# Patient Record
Sex: MALE | Race: WHITE | NOT HISPANIC OR LATINO
[De-identification: names, ages, dates, MRNs, and addresses within clinical notes are randomized per-mention and may not be internally consistent; named-entity substitution may affect disease eponyms.]

---

## 2017-11-29 ENCOUNTER — APPOINTMENT (OUTPATIENT)
Dept: MRI IMAGING | Facility: CLINIC | Age: 67
End: 2017-11-29
Payer: MEDICARE

## 2017-11-29 ENCOUNTER — OUTPATIENT (OUTPATIENT)
Dept: OUTPATIENT SERVICES | Facility: HOSPITAL | Age: 67
LOS: 1 days | End: 2017-11-29
Payer: MEDICARE

## 2017-11-29 DIAGNOSIS — Z00.8 ENCOUNTER FOR OTHER GENERAL EXAMINATION: ICD-10-CM

## 2017-11-29 DIAGNOSIS — Z98.89 OTHER SPECIFIED POSTPROCEDURAL STATES: Chronic | ICD-10-CM

## 2017-11-29 DIAGNOSIS — T85.84XA PAIN DUE TO INTERNAL PROSTHETIC DEVICES, IMPLANTS AND GRAFTS, NOT ELSEWHERE CLASSIFIED, INITIAL ENCOUNTER: Chronic | ICD-10-CM

## 2017-11-29 PROCEDURE — 73218 MRI UPPER EXTREMITY W/O DYE: CPT | Mod: 26,LT

## 2017-11-29 PROCEDURE — 73218 MRI UPPER EXTREMITY W/O DYE: CPT

## 2018-06-23 ENCOUNTER — EMERGENCY (EMERGENCY)
Facility: HOSPITAL | Age: 68
LOS: 1 days | Discharge: HOME | End: 2018-06-23
Attending: EMERGENCY MEDICINE
Payer: MEDICARE

## 2018-06-23 VITALS
TEMPERATURE: 98 F | SYSTOLIC BLOOD PRESSURE: 176 MMHG | OXYGEN SATURATION: 95 % | DIASTOLIC BLOOD PRESSURE: 89 MMHG | HEART RATE: 64 BPM | RESPIRATION RATE: 16 BRPM

## 2018-06-23 DIAGNOSIS — Z98.89 OTHER SPECIFIED POSTPROCEDURAL STATES: Chronic | ICD-10-CM

## 2018-06-23 DIAGNOSIS — T85.84XA PAIN DUE TO INTERNAL PROSTHETIC DEVICES, IMPLANTS AND GRAFTS, NOT ELSEWHERE CLASSIFIED, INITIAL ENCOUNTER: Chronic | ICD-10-CM

## 2018-06-23 PROCEDURE — 99283 EMERGENCY DEPT VISIT LOW MDM: CPT

## 2018-06-23 PROCEDURE — 99284 EMERGENCY DEPT VISIT MOD MDM: CPT

## 2018-06-23 PROCEDURE — 73564 X-RAY EXAM KNEE 4 OR MORE: CPT

## 2018-06-23 PROCEDURE — 73564 X-RAY EXAM KNEE 4 OR MORE: CPT | Mod: 26,LT

## 2018-06-23 RX ORDER — ACETAMINOPHEN 500 MG
650 TABLET ORAL ONCE
Qty: 0 | Refills: 0 | Status: COMPLETED | OUTPATIENT
Start: 2018-06-23 | End: 2018-06-23

## 2018-06-23 RX ADMIN — Medication 650 MILLIGRAM(S): at 11:36

## 2018-06-23 NOTE — ED PROVIDER NOTE - PROGRESS NOTE DETAILS
Seen by ortho req opt follow up  Alf Quesada MD, Facep Seen by ortho req opt follow up, Knee imobilizer/ace applied by ortho.  Alf Quesada MD, Facep

## 2018-06-23 NOTE — CONSULT NOTE ADULT - SUBJECTIVE AND OBJECTIVE BOX
Orthopaedic Surgery Consult Note    Patient is a 67y old  Male who presents with a chief complaint of L knee pain s/p runnning for ball. Patient hyperextended knee. Similar incident 3 weeks ago.      PAST MEDICAL & SURGICAL HISTORY:  GERD (gastroesophageal reflux disease)  High cholesterol  S/P left knee surgery  H/O shoulder surgery: bilateral  Dental implant pain    [] No significant past history as reviewed with the patient and family    FAMILY HISTORY:  No pertinent family history in first degree relatives    [] Family history not pertinent as reviewed with the patient and family    SOCIAL HISTORY:    MEDICATIONS  (STANDING):    MEDICATIONS  (PRN):    Allergies    No Known Allergies    Intolerances        REVIEW OF SYSTEMS      Vital Signs Last 24 Hrs  T(C): 36.9 (23 Jun 2018 10:48), Max: 36.9 (23 Jun 2018 10:48)  T(F): 98.4 (23 Jun 2018 10:48), Max: 98.4 (23 Jun 2018 10:48)  HR: 64 (23 Jun 2018 10:48) (64 - 64)  BP: 176/89 (23 Jun 2018 10:48) (176/89 - 176/89)  BP(mean): --  RR: 16 (23 Jun 2018 10:48) (16 - 16)  SpO2: 95% (23 Jun 2018 10:48) (95% - 95%)      PHYSICAL EXAM:  L Knee:   skin intact, No tenderness to palpation medial or lateral joint line, patella, distal femur, tib/fib  ROM: Full active and passive ROM without pain  negative Arianna's, Negative anterior and posterior drawer  Motor intact TA/GCS/EHL/FHL   SILT DP/SP/Tib  cap refill <2 sec, wwp    Imaging: Xrays L knee negative    A/P: 67M with left knee pain, negative imaging, negative exam  - WBAT (may give crutches if cannot walk out of ED)  - ACE and soft roll for comfort, KI for comfort while walking today  - encourage to attempt ambulation 6/23 evening without KI  - f/u in office with Dr. Ames within 1 week of discharge  - discussed with Dr. Ames                        IMAGING STUDIES:    67y Male

## 2018-06-23 NOTE — ED PROVIDER NOTE - OBJECTIVE STATEMENT
Private Physician Blayne Antoine PCP, Sahil Ames ([address])  67 y male pmh Rotator cuff, Left knee  meniscus surg, Gerd, HLD, Pt comes to ed complains of pain left knee sp funning for ball. Had knee hyperextended. Had similar pain three weeks ago "much less severe". Pain now 2/10. Max was 9/10 initially. Seen of field and treated with ace and ice pack.

## 2018-06-23 NOTE — ED ADULT NURSE NOTE - OBJECTIVE STATEMENT
67 year old retired ,  had a  soft ball accident this am, pt was running for fly ball and he stopped, his foot caught the ground, hyperextended left leg and then fell. Pt was BIB EMS, pt now c/o pain, some swelling noted

## 2018-06-23 NOTE — ED PROVIDER NOTE - CARE PLAN
Principal Discharge DX:	Knee strain, left, initial encounter  Assessment and plan of treatment:	1. Follow up with your orthopedic Dr Next week  2. Use knee immobilizer  3. Partial weight bearing/crutched  4. Ice elevation next 24 hours

## 2018-06-23 NOTE — ED PROVIDER NOTE - PLAN OF CARE
1. Follow up with your orthopedic Dr Next week  2. Use knee immobilizer  3. Partial weight bearing/crutched  4. Ice elevation next 24 hours

## 2018-06-26 ENCOUNTER — FORM ENCOUNTER (OUTPATIENT)
Age: 68
End: 2018-06-26

## 2018-06-26 ENCOUNTER — APPOINTMENT (OUTPATIENT)
Dept: ORTHOPEDIC SURGERY | Facility: CLINIC | Age: 68
End: 2018-06-26
Payer: MEDICARE

## 2018-06-26 VITALS
BODY MASS INDEX: 23.54 KG/M2 | SYSTOLIC BLOOD PRESSURE: 159 MMHG | HEIGHT: 67 IN | WEIGHT: 150 LBS | DIASTOLIC BLOOD PRESSURE: 81 MMHG | HEART RATE: 62 BPM

## 2018-06-26 DIAGNOSIS — M25.462 EFFUSION, LEFT KNEE: ICD-10-CM

## 2018-06-26 DIAGNOSIS — S83.92XA SPRAIN OF UNSPECIFIED SITE OF LEFT KNEE, INITIAL ENCOUNTER: ICD-10-CM

## 2018-06-26 PROCEDURE — 99204 OFFICE O/P NEW MOD 45 MIN: CPT

## 2018-06-26 PROCEDURE — 73564 X-RAY EXAM KNEE 4 OR MORE: CPT | Mod: LT

## 2018-06-26 RX ORDER — MELOXICAM 15 MG/1
15 TABLET ORAL DAILY
Qty: 30 | Refills: 0 | Status: ACTIVE | COMMUNITY
Start: 2018-06-26 | End: 1900-01-01

## 2018-06-27 ENCOUNTER — APPOINTMENT (OUTPATIENT)
Dept: MRI IMAGING | Facility: CLINIC | Age: 68
End: 2018-06-27
Payer: MEDICARE

## 2018-06-27 ENCOUNTER — OUTPATIENT (OUTPATIENT)
Dept: OUTPATIENT SERVICES | Facility: HOSPITAL | Age: 68
LOS: 1 days | End: 2018-06-27
Payer: MEDICARE

## 2018-06-27 DIAGNOSIS — Z98.89 OTHER SPECIFIED POSTPROCEDURAL STATES: Chronic | ICD-10-CM

## 2018-06-27 DIAGNOSIS — Z00.8 ENCOUNTER FOR OTHER GENERAL EXAMINATION: ICD-10-CM

## 2018-06-27 DIAGNOSIS — T85.84XA PAIN DUE TO INTERNAL PROSTHETIC DEVICES, IMPLANTS AND GRAFTS, NOT ELSEWHERE CLASSIFIED, INITIAL ENCOUNTER: Chronic | ICD-10-CM

## 2018-06-27 PROCEDURE — 73721 MRI JNT OF LWR EXTRE W/O DYE: CPT | Mod: 26,LT

## 2018-06-27 PROCEDURE — 73721 MRI JNT OF LWR EXTRE W/O DYE: CPT

## 2018-06-29 ENCOUNTER — APPOINTMENT (OUTPATIENT)
Dept: ORTHOPEDIC SURGERY | Facility: CLINIC | Age: 68
End: 2018-06-29
Payer: MEDICARE

## 2018-06-29 VITALS
WEIGHT: 150 LBS | DIASTOLIC BLOOD PRESSURE: 77 MMHG | HEIGHT: 67 IN | BODY MASS INDEX: 23.54 KG/M2 | SYSTOLIC BLOOD PRESSURE: 155 MMHG | HEART RATE: 69 BPM

## 2018-06-29 DIAGNOSIS — Z86.39 PERSONAL HISTORY OF OTHER ENDOCRINE, NUTRITIONAL AND METABOLIC DISEASE: ICD-10-CM

## 2018-06-29 DIAGNOSIS — Z87.39 PERSONAL HISTORY OF OTHER DISEASES OF THE MUSCULOSKELETAL SYSTEM AND CONNECTIVE TISSUE: ICD-10-CM

## 2018-06-29 DIAGNOSIS — Z87.891 PERSONAL HISTORY OF NICOTINE DEPENDENCE: ICD-10-CM

## 2018-06-29 PROCEDURE — 99214 OFFICE O/P EST MOD 30 MIN: CPT

## 2018-06-29 RX ORDER — FLUTICASONE PROPIONATE 50 UG/1
50 SPRAY, METERED NASAL
Qty: 16 | Refills: 0 | Status: ACTIVE | COMMUNITY
Start: 2018-03-31

## 2018-06-29 RX ORDER — MELOXICAM 15 MG/1
15 TABLET ORAL
Qty: 60 | Refills: 0 | Status: ACTIVE | COMMUNITY
Start: 2018-01-16

## 2018-06-29 RX ORDER — CIPROFLOXACIN HYDROCHLORIDE 500 MG/1
500 TABLET, FILM COATED ORAL
Qty: 20 | Refills: 0 | Status: ACTIVE | COMMUNITY
Start: 2018-01-29

## 2018-06-29 RX ORDER — DOXYCYCLINE 100 MG/1
100 CAPSULE ORAL
Qty: 14 | Refills: 0 | Status: ACTIVE | COMMUNITY
Start: 2018-04-10

## 2018-06-29 RX ORDER — METRONIDAZOLE 500 MG/1
500 TABLET ORAL
Qty: 30 | Refills: 0 | Status: ACTIVE | COMMUNITY
Start: 2018-01-29

## 2018-06-30 ENCOUNTER — TRANSCRIPTION ENCOUNTER (OUTPATIENT)
Age: 68
End: 2018-06-30

## 2018-07-02 ENCOUNTER — APPOINTMENT (OUTPATIENT)
Dept: ORTHOPEDIC SURGERY | Facility: CLINIC | Age: 68
End: 2018-07-02
Payer: MEDICARE

## 2018-07-02 DIAGNOSIS — S83.289A OTHER TEAR OF LATERAL MENISCUS, CURRENT INJURY, UNSPECIFIED KNEE, INITIAL ENCOUNTER: ICD-10-CM

## 2018-07-02 PROCEDURE — 99214 OFFICE O/P EST MOD 30 MIN: CPT

## 2018-07-10 ENCOUNTER — APPOINTMENT (OUTPATIENT)
Dept: ORTHOPEDIC SURGERY | Facility: CLINIC | Age: 68
End: 2018-07-10
Payer: MEDICARE

## 2018-07-10 VITALS — WEIGHT: 150 LBS | BODY MASS INDEX: 23.54 KG/M2 | HEIGHT: 67 IN

## 2018-07-10 PROCEDURE — 20610 DRAIN/INJ JOINT/BURSA W/O US: CPT | Mod: LT

## 2018-07-10 PROCEDURE — 99214 OFFICE O/P EST MOD 30 MIN: CPT | Mod: 25

## 2018-07-16 ENCOUNTER — APPOINTMENT (OUTPATIENT)
Dept: ORTHOPEDIC SURGERY | Facility: CLINIC | Age: 68
End: 2018-07-16

## 2018-08-14 ENCOUNTER — APPOINTMENT (OUTPATIENT)
Dept: ORTHOPEDIC SURGERY | Facility: CLINIC | Age: 68
End: 2018-08-14
Payer: MEDICARE

## 2018-08-14 VITALS — BODY MASS INDEX: 23.54 KG/M2 | HEIGHT: 67 IN | RESPIRATION RATE: 14 BRPM | WEIGHT: 150 LBS

## 2018-08-14 DIAGNOSIS — S83.519A SPRAIN OF ANTERIOR CRUCIATE LIGAMENT OF UNSPECIFIED KNEE, INITIAL ENCOUNTER: ICD-10-CM

## 2018-08-14 DIAGNOSIS — S83.249A OTHER TEAR OF MEDIAL MENISCUS, CURRENT INJURY, UNSPECIFIED KNEE, INITIAL ENCOUNTER: ICD-10-CM

## 2018-08-14 PROCEDURE — 99214 OFFICE O/P EST MOD 30 MIN: CPT

## 2018-10-23 ENCOUNTER — APPOINTMENT (OUTPATIENT)
Dept: ORTHOPEDIC SURGERY | Facility: CLINIC | Age: 68
End: 2018-10-23
Payer: MEDICARE

## 2018-10-23 VITALS — WEIGHT: 150 LBS | HEIGHT: 67 IN | BODY MASS INDEX: 23.54 KG/M2

## 2018-10-23 PROCEDURE — 20610 DRAIN/INJ JOINT/BURSA W/O US: CPT | Mod: LT

## 2018-10-23 PROCEDURE — 99214 OFFICE O/P EST MOD 30 MIN: CPT | Mod: 25

## 2018-10-23 PROCEDURE — 73030 X-RAY EXAM OF SHOULDER: CPT | Mod: LT

## 2018-11-26 ENCOUNTER — OUTPATIENT (OUTPATIENT)
Dept: OUTPATIENT SERVICES | Facility: HOSPITAL | Age: 68
LOS: 1 days | End: 2018-11-26
Payer: MEDICARE

## 2018-11-26 VITALS
HEART RATE: 62 BPM | DIASTOLIC BLOOD PRESSURE: 84 MMHG | HEIGHT: 66 IN | SYSTOLIC BLOOD PRESSURE: 174 MMHG | TEMPERATURE: 98 F | WEIGHT: 149.03 LBS | RESPIRATION RATE: 16 BRPM

## 2018-11-26 DIAGNOSIS — Z98.89 OTHER SPECIFIED POSTPROCEDURAL STATES: Chronic | ICD-10-CM

## 2018-11-26 DIAGNOSIS — Z98.890 OTHER SPECIFIED POSTPROCEDURAL STATES: Chronic | ICD-10-CM

## 2018-11-26 DIAGNOSIS — Z01.818 ENCOUNTER FOR OTHER PREPROCEDURAL EXAMINATION: ICD-10-CM

## 2018-11-26 DIAGNOSIS — S83.519A SPRAIN OF ANTERIOR CRUCIATE LIGAMENT OF UNSPECIFIED KNEE, INITIAL ENCOUNTER: ICD-10-CM

## 2018-11-26 DIAGNOSIS — S83.512A SPRAIN OF ANTERIOR CRUCIATE LIGAMENT OF LEFT KNEE, INITIAL ENCOUNTER: ICD-10-CM

## 2018-11-26 DIAGNOSIS — T85.84XA PAIN DUE TO INTERNAL PROSTHETIC DEVICES, IMPLANTS AND GRAFTS, NOT ELSEWHERE CLASSIFIED, INITIAL ENCOUNTER: Chronic | ICD-10-CM

## 2018-11-26 LAB
ALBUMIN SERPL ELPH-MCNC: 3.6 G/DL — SIGNIFICANT CHANGE UP (ref 3.3–5)
ALP SERPL-CCNC: 48 U/L — SIGNIFICANT CHANGE UP (ref 30–120)
ALT FLD-CCNC: 31 U/L DA — SIGNIFICANT CHANGE UP (ref 10–60)
ANION GAP SERPL CALC-SCNC: 4 MMOL/L — LOW (ref 5–17)
AST SERPL-CCNC: 23 U/L — SIGNIFICANT CHANGE UP (ref 10–40)
BILIRUB SERPL-MCNC: 1.4 MG/DL — HIGH (ref 0.2–1.2)
BUN SERPL-MCNC: 24 MG/DL — HIGH (ref 7–23)
CALCIUM SERPL-MCNC: 9.4 MG/DL — SIGNIFICANT CHANGE UP (ref 8.4–10.5)
CHLORIDE SERPL-SCNC: 104 MMOL/L — SIGNIFICANT CHANGE UP (ref 96–108)
CO2 SERPL-SCNC: 32 MMOL/L — HIGH (ref 22–31)
CREAT SERPL-MCNC: 1.11 MG/DL — SIGNIFICANT CHANGE UP (ref 0.5–1.3)
GLUCOSE SERPL-MCNC: 77 MG/DL — SIGNIFICANT CHANGE UP (ref 70–99)
HCT VFR BLD CALC: 49.7 % — SIGNIFICANT CHANGE UP (ref 39–50)
HGB BLD-MCNC: 16.4 G/DL — SIGNIFICANT CHANGE UP (ref 13–17)
MCHC RBC-ENTMCNC: 30.3 PG — SIGNIFICANT CHANGE UP (ref 27–34)
MCHC RBC-ENTMCNC: 33 GM/DL — SIGNIFICANT CHANGE UP (ref 32–36)
MCV RBC AUTO: 91.9 FL — SIGNIFICANT CHANGE UP (ref 80–100)
NRBC # BLD: 0 /100 WBCS — SIGNIFICANT CHANGE UP (ref 0–0)
PLATELET # BLD AUTO: 300 K/UL — SIGNIFICANT CHANGE UP (ref 150–400)
POTASSIUM SERPL-MCNC: 4.4 MMOL/L — SIGNIFICANT CHANGE UP (ref 3.5–5.3)
POTASSIUM SERPL-SCNC: 4.4 MMOL/L — SIGNIFICANT CHANGE UP (ref 3.5–5.3)
PROT SERPL-MCNC: 7.3 G/DL — SIGNIFICANT CHANGE UP (ref 6–8.3)
RBC # BLD: 5.41 M/UL — SIGNIFICANT CHANGE UP (ref 4.2–5.8)
RBC # FLD: 12.3 % — SIGNIFICANT CHANGE UP (ref 10.3–14.5)
SODIUM SERPL-SCNC: 140 MMOL/L — SIGNIFICANT CHANGE UP (ref 135–145)
WBC # BLD: 5.92 K/UL — SIGNIFICANT CHANGE UP (ref 3.8–10.5)
WBC # FLD AUTO: 5.92 K/UL — SIGNIFICANT CHANGE UP (ref 3.8–10.5)

## 2018-11-26 PROCEDURE — 36415 COLL VENOUS BLD VENIPUNCTURE: CPT

## 2018-11-26 PROCEDURE — G0463: CPT

## 2018-11-26 PROCEDURE — 93010 ELECTROCARDIOGRAM REPORT: CPT

## 2018-11-26 PROCEDURE — 93005 ELECTROCARDIOGRAM TRACING: CPT

## 2018-11-26 PROCEDURE — 80053 COMPREHEN METABOLIC PANEL: CPT

## 2018-11-26 PROCEDURE — 85027 COMPLETE CBC AUTOMATED: CPT

## 2018-11-26 NOTE — H&P PST ADULT - PSH
H/O shoulder surgery  left 2005  right 2003  History of kidney surgery  horseshoe kidneys 1954  History of nasal surgery  2000's  S/P left knee surgery  2008

## 2018-11-26 NOTE — H&P PST ADULT - NEGATIVE ENMT SYMPTOMS
no ear pain/no nasal obstruction/no nasal discharge/no post-nasal discharge/no gum bleeding/no throat pain/no tinnitus/no vertigo/no sinus symptoms/no hearing difficulty/no abnormal taste sensation/no nasal congestion/no nose bleeds/no recurrent cold sores/no dry mouth/no dysphagia

## 2018-11-26 NOTE — H&P PST ADULT - PMH
ACL (anterior cruciate ligament) tear  left  Dyslipidemia    Insomnia    Seasonal allergies    Shoulder pain, bilateral

## 2018-11-26 NOTE — H&P PST ADULT - PROBLEM SELECTOR PLAN 1
"left knee arthroscopic anterior cruciate ligament reconstruction with allograft" on 12/12/18  Diagnostics ordered  Pending medical clearance  Questions answered  Instructions reviewed and signed  Contact info given  Best wishes offered

## 2018-11-26 NOTE — H&P PST ADULT - HISTORY OF PRESENT ILLNESS
68 yo male presents to PST for scheduled LEFT knee arthroscopic anterior cruciate ligament reconstruction with allograft on 12/12/18 with Tu Ames MD.  S/P sports injury 6/23/18 with pain since for which he has tried physical therapy without relief.  ROM with occasional pain related to ROM.

## 2018-11-26 NOTE — H&P PST ADULT - NSANTHOSAYNRD_GEN_A_CORE
No. SUZETTE screening performed.  STOP BANG Legend: 0-2 = LOW Risk; 3-4 = INTERMEDIATE Risk; 5-8 = HIGH Risk

## 2018-11-26 NOTE — H&P PST ADULT - FAMILY HISTORY
Mother  Still living? No  Family history of colon cancer, Age at diagnosis: Age Unknown     Father  Still living? No  Family history of heart attack, Age at diagnosis: Age Unknown     Sibling  Still living? Yes, Estimated age: 61-70  Family history of heart disease, Age at diagnosis: Age Unknown

## 2018-12-11 ENCOUNTER — TRANSCRIPTION ENCOUNTER (OUTPATIENT)
Age: 68
End: 2018-12-11

## 2018-12-12 ENCOUNTER — OUTPATIENT (OUTPATIENT)
Dept: OUTPATIENT SERVICES | Facility: HOSPITAL | Age: 68
LOS: 1 days | End: 2018-12-12
Payer: MEDICARE

## 2018-12-12 ENCOUNTER — RESULT REVIEW (OUTPATIENT)
Age: 68
End: 2018-12-12

## 2018-12-12 ENCOUNTER — APPOINTMENT (OUTPATIENT)
Dept: ORTHOPEDIC SURGERY | Facility: HOSPITAL | Age: 68
End: 2018-12-12

## 2018-12-12 VITALS
WEIGHT: 147.49 LBS | RESPIRATION RATE: 19 BRPM | OXYGEN SATURATION: 98 % | HEIGHT: 66 IN | DIASTOLIC BLOOD PRESSURE: 83 MMHG | HEART RATE: 65 BPM | TEMPERATURE: 98 F | SYSTOLIC BLOOD PRESSURE: 162 MMHG

## 2018-12-12 VITALS
SYSTOLIC BLOOD PRESSURE: 159 MMHG | HEART RATE: 67 BPM | TEMPERATURE: 98 F | OXYGEN SATURATION: 100 % | RESPIRATION RATE: 15 BRPM | DIASTOLIC BLOOD PRESSURE: 84 MMHG

## 2018-12-12 DIAGNOSIS — Z98.89 OTHER SPECIFIED POSTPROCEDURAL STATES: Chronic | ICD-10-CM

## 2018-12-12 DIAGNOSIS — Z98.890 OTHER SPECIFIED POSTPROCEDURAL STATES: Chronic | ICD-10-CM

## 2018-12-12 DIAGNOSIS — Z01.818 ENCOUNTER FOR OTHER PREPROCEDURAL EXAMINATION: ICD-10-CM

## 2018-12-12 DIAGNOSIS — S83.512A SPRAIN OF ANTERIOR CRUCIATE LIGAMENT OF LEFT KNEE, INITIAL ENCOUNTER: ICD-10-CM

## 2018-12-12 PROCEDURE — 97161 PT EVAL LOW COMPLEX 20 MIN: CPT | Mod: CJ

## 2018-12-12 PROCEDURE — C1889: CPT

## 2018-12-12 PROCEDURE — G8979: CPT | Mod: CJ

## 2018-12-12 PROCEDURE — G8980: CPT | Mod: CJ

## 2018-12-12 PROCEDURE — 29880 ARTHRS KNE SRG MNISECTMY M&L: CPT | Mod: LT

## 2018-12-12 PROCEDURE — 29888 ARTHRS AID ACL RPR/AGMNTJ: CPT | Mod: LT

## 2018-12-12 PROCEDURE — 88304 TISSUE EXAM BY PATHOLOGIST: CPT

## 2018-12-12 PROCEDURE — G8978: CPT | Mod: CJ

## 2018-12-12 PROCEDURE — C1713: CPT

## 2018-12-12 PROCEDURE — 88304 TISSUE EXAM BY PATHOLOGIST: CPT | Mod: 26

## 2018-12-12 RX ORDER — CEFAZOLIN SODIUM 1 G
2000 VIAL (EA) INJECTION ONCE
Qty: 0 | Refills: 0 | Status: COMPLETED | OUTPATIENT
Start: 2018-12-12 | End: 2018-12-12

## 2018-12-12 RX ORDER — SODIUM CHLORIDE 9 MG/ML
1000 INJECTION, SOLUTION INTRAVENOUS
Qty: 0 | Refills: 0 | Status: DISCONTINUED | OUTPATIENT
Start: 2018-12-12 | End: 2018-12-12

## 2018-12-12 RX ORDER — EZETIMIBE 10 MG/1
1 TABLET ORAL
Qty: 0 | Refills: 0 | COMMUNITY

## 2018-12-12 RX ORDER — HYDROMORPHONE HYDROCHLORIDE 2 MG/ML
0.5 INJECTION INTRAMUSCULAR; INTRAVENOUS; SUBCUTANEOUS
Qty: 0 | Refills: 0 | Status: DISCONTINUED | OUTPATIENT
Start: 2018-12-12 | End: 2018-12-12

## 2018-12-12 RX ORDER — CHLORHEXIDINE GLUCONATE 213 G/1000ML
1 SOLUTION TOPICAL ONCE
Qty: 0 | Refills: 0 | Status: COMPLETED | OUTPATIENT
Start: 2018-12-12 | End: 2018-12-12

## 2018-12-12 RX ORDER — ACETAMINOPHEN 500 MG
1000 TABLET ORAL ONCE
Qty: 0 | Refills: 0 | Status: COMPLETED | OUTPATIENT
Start: 2018-12-12 | End: 2018-12-12

## 2018-12-12 RX ORDER — OXYCODONE HYDROCHLORIDE 5 MG/1
5 TABLET ORAL ONCE
Qty: 0 | Refills: 0 | Status: DISCONTINUED | OUTPATIENT
Start: 2018-12-12 | End: 2018-12-12

## 2018-12-12 RX ADMIN — HYDROMORPHONE HYDROCHLORIDE 0.5 MILLIGRAM(S): 2 INJECTION INTRAMUSCULAR; INTRAVENOUS; SUBCUTANEOUS at 15:30

## 2018-12-12 RX ADMIN — SODIUM CHLORIDE 100 MILLILITER(S): 9 INJECTION, SOLUTION INTRAVENOUS at 15:37

## 2018-12-12 RX ADMIN — OXYCODONE HYDROCHLORIDE 5 MILLIGRAM(S): 5 TABLET ORAL at 16:15

## 2018-12-12 RX ADMIN — CHLORHEXIDINE GLUCONATE 1 APPLICATION(S): 213 SOLUTION TOPICAL at 11:51

## 2018-12-12 RX ADMIN — HYDROMORPHONE HYDROCHLORIDE 0.5 MILLIGRAM(S): 2 INJECTION INTRAMUSCULAR; INTRAVENOUS; SUBCUTANEOUS at 15:45

## 2018-12-12 RX ADMIN — OXYCODONE HYDROCHLORIDE 5 MILLIGRAM(S): 5 TABLET ORAL at 15:45

## 2018-12-12 NOTE — BRIEF OPERATIVE NOTE - PRE-OP DX
Rupture of anterior cruciate ligament of left knee, initial encounter  12/12/2018    Active  Rula Harris

## 2018-12-12 NOTE — ASU DISCHARGE PLAN (ADULT/PEDIATRIC). - NOTIFY
Fever greater than 101/Inability to Tolerate Liquids or Foods/Pain not relieved by Medications/Swelling that continues/Persistent Nausea and Vomiting/Bleeding that does not stop

## 2018-12-12 NOTE — BRIEF OPERATIVE NOTE - PROCEDURE
<<-----Click on this checkbox to enter Procedure Arthroscopic repair of anterior cruciate ligament of left knee using allograft  12/12/2018    Active  KALCHERI  Arthroscopy of knee with debridement and meniscectomy  12/12/2018  lateral and medial  Active  KALPY

## 2018-12-12 NOTE — PHYSICAL THERAPY INITIAL EVALUATION ADULT - LIVES WITH, PROFILE
Patient seen in the clinic today. His open abdominal wound continues to granulate and become smaller with the VAC dressing. Undermining at the periphery continues to become less. Mesh is still visible but becoming more common with granulation. He is doing well clinically. We will continue to use a VAC dressing and follow in wound clinic.    SUHAIL Juarez MD  12/8/2017    
spouse

## 2018-12-12 NOTE — ASU DISCHARGE PLAN (ADULT/PEDIATRIC). - MEDICATION SUMMARY - MEDICATIONS TO TAKE
I will START or STAY ON the medications listed below when I get home from the hospital:    predniSONE 30 mg oral tablet  -- 1 milligram(s) by mouth once a day  -- Indication: For Continue home medication    Percocet 5/325 oral tablet  -- 1 tab(s) by mouth every 6 hours, As Needed -for moderate pain MDD:4   -- Caution federal law prohibits the transfer of this drug to any person other  than the person for whom it was prescribed.  May cause drowsiness.  Alcohol may intensify this effect.  Use care when operating dangerous machinery.  This prescription cannot be refilled.  This product contains acetaminophen.  Do not use  with any other product containing acetaminophen to prevent possible liver damage.  Using more of this medication than prescribed may cause serious breathing problems.    -- Indication: For  as needed for pain to alternate with Naprosyn    Naprosyn 500 mg oral tablet  -- 1 tab(s) by mouth 2 times a day, As Needed -for mild pain MDD:2  -- Check with your doctor before becoming pregnant.  May cause drowsiness or dizziness.  Obtain medical advice before taking any non-prescription drugs as some may affect the action of this medication.  Take with food or milk.    -- Indication: For as needed for pain to alternate with percocet    LORazepam 0.5 mg oral tablet  -- orally prn, As Needed  -- Indication: For Continue home medication    Zetia 10 mg oral tablet  -- 1 tab(s) by mouth every other day  -- Indication: For Continue home medication    montelukast 10 mg oral tablet  -- 1 tab(s) by mouth once a day  -- Indication: For Continue home medication    Nasacort Allergy 24HR 55 mcg/inh nasal spray  -- 2 spray(s) into nose once a day  -- Indication: For Continue home medication

## 2018-12-12 NOTE — PHYSICAL THERAPY INITIAL EVALUATION ADULT - CRITERIA FOR SKILLED THERAPEUTIC INTERVENTIONS
d/c home with ax crutches/anticipated discharge recommendation/anticipated equipment needs at discharge

## 2018-12-12 NOTE — PHYSICAL THERAPY INITIAL EVALUATION ADULT - ADDITIONAL COMMENTS
pvt home with 1 ERAN w/o HR, or 8 steps w/ HR, 12 steps inside w/ HR however pt states he will be staying on the first floor for 1 week with bathroom/bedroom. Pt has forearm crutches

## 2018-12-12 NOTE — ASU DISCHARGE PLAN (ADULT/PEDIATRIC). - ACTIVITY LEVEL
weight bear as tolerated and ambulate with the brace on to prevent buckeling/weight bearing as tolerated/exercise

## 2018-12-13 PROBLEM — E78.5 HYPERLIPIDEMIA, UNSPECIFIED: Chronic | Status: ACTIVE | Noted: 2018-11-26

## 2018-12-13 PROBLEM — G47.00 INSOMNIA, UNSPECIFIED: Chronic | Status: ACTIVE | Noted: 2018-11-26

## 2018-12-13 PROBLEM — J30.2 OTHER SEASONAL ALLERGIC RHINITIS: Chronic | Status: ACTIVE | Noted: 2018-11-26

## 2018-12-13 PROBLEM — M25.511 PAIN IN RIGHT SHOULDER: Chronic | Status: ACTIVE | Noted: 2018-11-26

## 2018-12-13 PROBLEM — S83.519A SPRAIN OF ANTERIOR CRUCIATE LIGAMENT OF UNSPECIFIED KNEE, INITIAL ENCOUNTER: Chronic | Status: ACTIVE | Noted: 2018-11-26

## 2018-12-14 LAB
SURGICAL PATHOLOGY FINAL REPORT - CH: SIGNIFICANT CHANGE UP
SURGICAL PATHOLOGY FINAL REPORT - CH: SIGNIFICANT CHANGE UP

## 2018-12-21 ENCOUNTER — APPOINTMENT (OUTPATIENT)
Dept: ORTHOPEDIC SURGERY | Facility: CLINIC | Age: 68
End: 2018-12-21
Payer: MEDICARE

## 2018-12-21 VITALS — HEIGHT: 67 IN | BODY MASS INDEX: 23.54 KG/M2 | WEIGHT: 150 LBS

## 2018-12-21 PROCEDURE — 99024 POSTOP FOLLOW-UP VISIT: CPT

## 2018-12-21 PROCEDURE — 73560 X-RAY EXAM OF KNEE 1 OR 2: CPT | Mod: LT

## 2019-01-22 ENCOUNTER — APPOINTMENT (OUTPATIENT)
Dept: ORTHOPEDIC SURGERY | Facility: CLINIC | Age: 69
End: 2019-01-22
Payer: MEDICARE

## 2019-01-22 VITALS — BODY MASS INDEX: 23.54 KG/M2 | WEIGHT: 150 LBS | HEIGHT: 67 IN

## 2019-01-22 PROCEDURE — 99213 OFFICE O/P EST LOW 20 MIN: CPT | Mod: 24

## 2019-01-22 NOTE — HISTORY OF PRESENT ILLNESS
[Clean/Dry/Intact] : clean, dry and intact [Neuro Intact] : an unremarkable neurological exam [Vascular Intact] : ~T peripheral vascular exam normal [Doing Well] : is doing well [Excellent Pain Control] : has excellent pain control [No Sign of Infection] : is showing no signs of infection [___ Weeks Post Op] : [unfilled] weeks post op [Healed] : healed [Xray (Date:___)] : [unfilled] Xray -  [Erythema] : not erythematous [Discharge] : absent of discharge [Swelling] : not swollen [Dehiscence] : not dehisced [de-identified] : s/p Left knee arthroscopy with anterior cruciate ligament reconstruction and medial and lateral meniscectomies on 12/12/2018. [de-identified] : 67 year old male presents for a post operative evaluation s/p Left knee arthroscopy with anterior cruciate ligament reconstruction and medial and lateral meniscectomies on 12/12/2018. He presents today wearing a [place] knee brace. He reports that he has been doing well since his last visit and denies fever, chills, nausea, or vomiting. He has been attending physical therapy and notes improvements in strength and ROM. He does report pain of his left shoulder with certain rotation and use of his shoulder.  [de-identified] : Left Upper Extremity\par o Shoulder :\par ¦ Inspection/Palpation : no tenderness, no swelling, no deformity \par ¦ Range of Motion : ACTIVE FORWARD ELEVATION: Measured at 150 degrees, ACTIVE EXTERNAL ROTATION: Measured at 70 degrees, ACTIVE INTERNAL ROTATION: Measured at T8\par ¦ Strength : external rotation 5/5 with pain, internal rotation 5/5, supraspinatus 4/5 with pain\par ¦ Stability : no joint instability on provocative testing\par ¦ Tests/Signs : Neer (+ mild), Titus (+ mild)\par o Upper Arm : no tenderness, no swelling, no deformities\par o Muscle Bulk : no atrophy \par o Sensation : sensation intact to light touch \par o Skin : no skin rash, no discoloration \par o Vascular Exam : no edema, no cyanosis, radial and ulnar pulses normal \par \par Left Lower Extremity\par o Knee :\par ¦ Inspection/Palpation : no tenderness to palpation, no swelling, no deformity, surgical incision well healed\par ¦ Range of Motion : 0 - 120 degrees, no crepitus\par ¦ Stability : no valgus or varus instability present on provocative testing, Lachman’s Test (-)\par ¦ Strength : flexion and extension 5/5 \par ¦ Tests and Signs: Anterior Drawer Test (-) \par o Muscle Bulk : normal muscle bulk present\par o Skin : no erythema, no ecchymosis \par o Sensation : sensation to pin intact\par o Vascular Exam : no edema, no cyanosis, dorsalis pedis artery pulse 2+, posterior tibial artery pulse 2+  [de-identified] : 67 year old male s/p Left knee arthroscopy with anterior cruciate ligament reconstruction and medial and lateral meniscectomies on 12/12/2018.\par \par Left shoulder impingement [de-identified] : He can discontinue use of the [place] knee brace.\par \par He is to continue with physical therapy. A prescription for Physical Therapy was provided. \par \par Activity modifications and restrictions were discussed. \par \par An MRI of the left shoulder was ordered to rule out rotator cuff tear.\par \par FU 6 weeks.

## 2019-01-22 NOTE — ADDENDUM
[FreeTextEntry1] : I, Elvira Santoro, acted solely as a scribe for Dr. Tu Ames on this date 01/22/2019 .

## 2019-01-22 NOTE — END OF VISIT
[FreeTextEntry3] : All medical record entries made by the Areliibe were at my, Dr. Tu Ames, direction and personally dictated by me on 01/22/2019. I have reviewed the chart and agree that the record accurately reflects my personal performance of the history, physical exam, assessment and plan. I have also personally directed, reviewed, and agreed with the chart.

## 2019-03-05 ENCOUNTER — APPOINTMENT (OUTPATIENT)
Dept: ORTHOPEDIC SURGERY | Facility: CLINIC | Age: 69
End: 2019-03-05
Payer: MEDICARE

## 2019-03-05 VITALS — WEIGHT: 150 LBS | HEIGHT: 67 IN | BODY MASS INDEX: 23.54 KG/M2

## 2019-03-05 PROCEDURE — 99024 POSTOP FOLLOW-UP VISIT: CPT

## 2019-03-05 NOTE — ADDENDUM
[FreeTextEntry1] : I, Elvira Santoro, acted solely as a scribe for Dr. Tu Ames on this date 03/05/2019.

## 2019-03-05 NOTE — HISTORY OF PRESENT ILLNESS
[___ Weeks Post Op] : [unfilled] weeks post op [Clean/Dry/Intact] : clean, dry and intact [Healed] : healed [Neuro Intact] : an unremarkable neurological exam [Vascular Intact] : ~T peripheral vascular exam normal [Doing Well] : is doing well [Excellent Pain Control] : has excellent pain control [No Sign of Infection] : is showing no signs of infection [Erythema] : not erythematous [Discharge] : absent of discharge [Swelling] : not swollen [Dehiscence] : not dehisced [de-identified] : s/p Left knee arthroscopy with anterior cruciate ligament reconstruction and medial and lateral meniscectomies on 12/12/2018. [de-identified] : 67 year old male presents for a post operative evaluation s/p Left knee arthroscopy with anterior cruciate ligament reconstruction and medial and lateral meniscectomies on 12/12/2018. He says that he has been doing well and has experienced minimal discomfort since his last visit, he denies fever, chills, nausea, or vomiting.  He has been attending physical therapy and notes improvements in strength and ROM. He has been exercising regularly and avoiding pivoting motions. He is interested in returning to activities such as jogging at this time.  [de-identified] : Left Lower Extremity\par o Knee :\par ¦ Inspection/Palpation : no tenderness to palpation along the joint lines, no swelling, no deformity, surgical incision well healed\par ¦ Range of Motion : 0 - 130 degrees, no crepitus\par ¦ Stability : no valgus or varus instability present on provocative testing, Lachman’s Test (-)\par ¦ Strength : flexion and extension 5/5 \par ¦ Tests and Signs: Anterior Drawer Test (-) \par o Muscle Bulk : normal muscle bulk present\par o Skin : no erythema, no ecchymosis \par o Sensation : sensation to pin intact\par o Vascular Exam : no edema, no cyanosis, dorsalis pedis artery pulse 2+, posterior tibial artery pulse 2+  [de-identified] : 67 year old male s/p Left knee arthroscopy with anterior cruciate ligament reconstruction and medial and lateral meniscectomies on 12/12/2018.\par \par  [de-identified] : He is to continue with physical therapy.\par \par Activity modifications and restrictions were discussed, he is still to avoid pivoting, he can begin with lateral movements. \par \par FU 3 months

## 2019-03-05 NOTE — END OF VISIT
[FreeTextEntry3] : All medical record entries made by the Areliibe were at my, Dr. Tu Ames, direction and personally dictated by me on 03/05/2019. I have reviewed the chart and agree that the record accurately reflects my personal performance of the history, physical exam, assessment and plan. I have also personally directed, reviewed, and agreed with the chart.

## 2019-03-22 ENCOUNTER — APPOINTMENT (OUTPATIENT)
Dept: ORTHOPEDIC SURGERY | Facility: CLINIC | Age: 69
End: 2019-03-22
Payer: MEDICARE

## 2019-03-22 VITALS — HEIGHT: 67 IN | BODY MASS INDEX: 23.54 KG/M2 | WEIGHT: 150 LBS

## 2019-03-22 PROCEDURE — 99214 OFFICE O/P EST MOD 30 MIN: CPT

## 2019-03-22 NOTE — END OF VISIT
[FreeTextEntry3] : All medical record entries made by the Areliibe were at my, Dr. Tu Ames, direction and personally dictated by me on 03/22/2019. I have reviewed the chart and agree that the record accurately reflects my personal performance of the history, physical exam, assessment and plan. I have also personally directed, reviewed, and agreed with the chart.

## 2019-03-22 NOTE — DISCUSSION/SUMMARY
[de-identified] : The underlying pathophysiology was reviewed in great detail with the patient as well as the various treatment options, including ice, analgesics, NSAIDs, Physical therapy, steroid injections and arthroscopic rotator cuff repair.\par \par He is to continue with a home exercise program. \par \par FU PRN.

## 2019-03-22 NOTE — ADDENDUM
[FreeTextEntry1] : I, Elvira Santoro, acted solely as a scribe for Dr. Tu Ames on this date 03/22/2019.

## 2019-03-22 NOTE — PHYSICAL EXAM
[Normal RUE] : Right Upper Extremity: No scars, rashes, lesions, ulcers, skin intact [Normal LUE] : Left Upper Extremity: No scars, rashes, lesions, ulcers, skin intact [Normal Touch] : sensation intact for touch [Normal] : No swelling, no edema, normal pedal pulses and normal temperature [Poor Appearance] : well-appearing [Acute Distress] : not in acute distress [Obese] : not obese [de-identified] : Left Upper Extremity\par o Shoulder : \par ¦ Inspection/Palpation : tenderness over the greater tuberosity, no swelling or deformities\par ¦ Range of Motion : ACTIVE FORWARD ELEVATION: Measured at 145 degrees, ACTIVE EXTERNAL ROTATION: Measured at 40 degrees, ACTIVE INTERNAL ROTATION:Measured at T6\par ¦ Strength : external rotation 5/5, internal rotation 5/5, supraspinatus 5/5 with pain\par ¦ Stability : no joint instability on provocative testing\par ¦ Tests/Signs : Neer (+), Titus (-)\par o Upper Arm : no tenderness, swelling or deformities\par o Muscle Bulk : no atrophy\par o Sensation : sensation intact to light touch\par o Skin : no skin rash or discoloration\par o Vascular Exam : no edema or cyanosis, radial and ulnar pulses normal  [de-identified] : o An MRI of the left shoulder was obtained at [facility] on 3/15/2019, revealed:\par 1. High-grade partial-thickness tear of the supraspinatus tendon posterior insertional fibers superimposed on tendinopathy in the examination. Associated subcortical enthesophyte cystic changes of the greater tuberosity.\par 2. Moderate infraspinatus and mild subscapularis tendinopathy without tears. \par 3. Superior labral tear extending to involve the posterosuperior and posteroinferior quadrants.

## 2019-03-22 NOTE — HISTORY OF PRESENT ILLNESS
[de-identified] : 68 year old male presents for an evaluation of chronic left shoulder pain that began in October 2018 after taking up swimming. He says that his pain has been progressively worsening since his previous visit and reports discomfort about the anterior aspect of his shoulder that is of a constant aching pain that is exacerbated with rotation of the shoulder and use of the arm. He also states that his pain is now waking him at night and he is unable to sleep on his left shoulder. He has been taking Arthrotec and Meloxicam for his pain and says it is providing him with minimal relief. He is here today for a review of an MRI of his left shoulder.

## 2019-06-11 ENCOUNTER — APPOINTMENT (OUTPATIENT)
Dept: ORTHOPEDIC SURGERY | Facility: CLINIC | Age: 69
End: 2019-06-11
Payer: MEDICARE

## 2019-06-11 VITALS — HEIGHT: 67 IN | BODY MASS INDEX: 23.54 KG/M2 | WEIGHT: 150 LBS

## 2019-06-11 PROCEDURE — 99213 OFFICE O/P EST LOW 20 MIN: CPT

## 2019-06-11 NOTE — HISTORY OF PRESENT ILLNESS
[de-identified] : 68 year old male presents for an evaluation of left knee pain, s/p Left knee arthroscopy with anterior cruciate ligament reconstruction and medial and lateral meniscectomies on 12/12/2018. He has been attending physical therapy and notes improvements in strength and ROM, he has been able to jump and squat during his sessions without pain. The patient has been doing well and that he has minimal discomfort about his knee and that he has been able to wean back into his regular physical activities without pain. He is interested in returning to softball and golf at this time.

## 2019-06-11 NOTE — PHYSICAL EXAM
[Normal RLE] : Right Lower Extremity: No scars, rashes, lesions, ulcers, skin intact [Normal] : No swelling, no edema, normal pedal pulses and normal temperature [Normal Touch] : sensation intact for touch [Acute Distress] : not in acute distress [Poor Appearance] : well-appearing [Obese] : not obese [de-identified] : Left Lower Extremity\par o Knee :\par ¦ Inspection/Palpation : no tenderness to palpation along the joint lines, no swelling, no deformity, surgical scars well appearing \par ¦ Range of Motion : 0 - 130 degrees, no crepitus\par ¦ Stability : no valgus or varus instability present on provocative testing, Lachman’s Test (-)\par ¦ Strength : flexion and extension 5/5\par ¦ Tests and Signs: Anterior Drawer Test (-) \par o Muscle Bulk : normal muscle bulk present\par o Skin : no erythema, no ecchymosis \par o Sensation : sensation to pin intact\par o Vascular Exam : no edema, no cyanosis, dorsalis pedis artery pulse 2+, posterior tibial artery pulse 2+

## 2019-06-11 NOTE — DISCUSSION/SUMMARY
[de-identified] : The underlying pathophysiology was reviewed in great detail with the patient as well as the various treatment options, including ice, analgesics, NSAIDs, Physical therapy, steroid injections.\par \par Activity modifications and restrictions were discussed. He can return to softball and golf on 7/1/2019 with the use of an ACL brace. \par \par FU PRN.

## 2019-06-11 NOTE — ADDENDUM
[FreeTextEntry1] : I, Elvira Santoro, acted solely as a scribe for Dr. Tu Ames on this date 06/11/2019.

## 2019-08-27 ENCOUNTER — OUTPATIENT (OUTPATIENT)
Dept: OUTPATIENT SERVICES | Facility: HOSPITAL | Age: 69
LOS: 1 days | End: 2019-08-27
Payer: MEDICARE

## 2019-08-27 VITALS
HEART RATE: 60 BPM | DIASTOLIC BLOOD PRESSURE: 80 MMHG | HEIGHT: 66 IN | WEIGHT: 143.96 LBS | TEMPERATURE: 98 F | OXYGEN SATURATION: 98 % | SYSTOLIC BLOOD PRESSURE: 140 MMHG | RESPIRATION RATE: 16 BRPM

## 2019-08-27 DIAGNOSIS — K81.0 ACUTE CHOLECYSTITIS: ICD-10-CM

## 2019-08-27 DIAGNOSIS — Z98.89 OTHER SPECIFIED POSTPROCEDURAL STATES: Chronic | ICD-10-CM

## 2019-08-27 DIAGNOSIS — Z98.890 OTHER SPECIFIED POSTPROCEDURAL STATES: Chronic | ICD-10-CM

## 2019-08-27 LAB
ALBUMIN SERPL ELPH-MCNC: 4.5 G/DL — SIGNIFICANT CHANGE UP (ref 3.3–5)
ALP SERPL-CCNC: 51 U/L — SIGNIFICANT CHANGE UP (ref 40–120)
ALT FLD-CCNC: 14 U/L — SIGNIFICANT CHANGE UP (ref 4–41)
ANION GAP SERPL CALC-SCNC: 11 MMO/L — SIGNIFICANT CHANGE UP (ref 7–14)
AST SERPL-CCNC: 19 U/L — SIGNIFICANT CHANGE UP (ref 4–40)
BILIRUB SERPL-MCNC: 1.8 MG/DL — HIGH (ref 0.2–1.2)
BUN SERPL-MCNC: 23 MG/DL — SIGNIFICANT CHANGE UP (ref 7–23)
CALCIUM SERPL-MCNC: 10.2 MG/DL — SIGNIFICANT CHANGE UP (ref 8.4–10.5)
CHLORIDE SERPL-SCNC: 100 MMOL/L — SIGNIFICANT CHANGE UP (ref 98–107)
CO2 SERPL-SCNC: 30 MMOL/L — SIGNIFICANT CHANGE UP (ref 22–31)
CREAT SERPL-MCNC: 1.09 MG/DL — SIGNIFICANT CHANGE UP (ref 0.5–1.3)
GLUCOSE SERPL-MCNC: 57 MG/DL — LOW (ref 70–99)
HCT VFR BLD CALC: 48.7 % — SIGNIFICANT CHANGE UP (ref 39–50)
HGB BLD-MCNC: 15.7 G/DL — SIGNIFICANT CHANGE UP (ref 13–17)
MCHC RBC-ENTMCNC: 29.6 PG — SIGNIFICANT CHANGE UP (ref 27–34)
MCHC RBC-ENTMCNC: 32.2 % — SIGNIFICANT CHANGE UP (ref 32–36)
MCV RBC AUTO: 91.7 FL — SIGNIFICANT CHANGE UP (ref 80–100)
NRBC # FLD: 0 K/UL — SIGNIFICANT CHANGE UP (ref 0–0)
PLATELET # BLD AUTO: 280 K/UL — SIGNIFICANT CHANGE UP (ref 150–400)
PMV BLD: 11.1 FL — SIGNIFICANT CHANGE UP (ref 7–13)
POTASSIUM SERPL-MCNC: 3.9 MMOL/L — SIGNIFICANT CHANGE UP (ref 3.5–5.3)
POTASSIUM SERPL-SCNC: 3.9 MMOL/L — SIGNIFICANT CHANGE UP (ref 3.5–5.3)
PROT SERPL-MCNC: 6.7 G/DL — SIGNIFICANT CHANGE UP (ref 6–8.3)
RBC # BLD: 5.31 M/UL — SIGNIFICANT CHANGE UP (ref 4.2–5.8)
RBC # FLD: 12.3 % — SIGNIFICANT CHANGE UP (ref 10.3–14.5)
SODIUM SERPL-SCNC: 141 MMOL/L — SIGNIFICANT CHANGE UP (ref 135–145)
WBC # BLD: 5.54 K/UL — SIGNIFICANT CHANGE UP (ref 3.8–10.5)
WBC # FLD AUTO: 5.54 K/UL — SIGNIFICANT CHANGE UP (ref 3.8–10.5)

## 2019-08-27 PROCEDURE — 93010 ELECTROCARDIOGRAM REPORT: CPT

## 2019-08-27 RX ORDER — SODIUM CHLORIDE 9 MG/ML
1000 INJECTION, SOLUTION INTRAVENOUS
Refills: 0 | Status: DISCONTINUED | OUTPATIENT
Start: 2019-09-12 | End: 2019-10-04

## 2019-08-27 RX ORDER — MONTELUKAST 4 MG/1
1 TABLET, CHEWABLE ORAL
Qty: 0 | Refills: 0 | DISCHARGE

## 2019-08-27 RX ORDER — TRIAMCINOLONE ACETONIDE 55 MCG
2 AEROSOL, SPRAY (GRAM) NASAL
Qty: 0 | Refills: 0 | DISCHARGE

## 2019-08-27 RX ORDER — EZETIMIBE 10 MG/1
1 TABLET ORAL
Qty: 0 | Refills: 0 | DISCHARGE

## 2019-08-27 NOTE — H&P PST ADULT - NSICDXPASTMEDICALHX_GEN_ALL_CORE_FT
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

## 2019-08-27 NOTE — H&P PST ADULT - NSICDXPASTSURGICALHX_GEN_ALL_CORE_FT
PAST SURGICAL HISTORY:  H/O shoulder surgery left 2005  right 2003    History of kidney surgery horseshoe kidneys 1954    History of nasal surgery 2000's    S/P left knee surgery 2008

## 2019-08-27 NOTE — H&P PST ADULT - NSICDXPROBLEM_GEN_ALL_CORE_FT
scheduled laparoscopic cholecystectomy on 9/12/19.  Written and verbal preop instructions, surgical soap, gi prophylaxis given  Pt verbalized good understanding, with teach back on surgical soap.

## 2019-08-27 NOTE — H&P PST ADULT - NEGATIVE GENERAL GENITOURINARY SYMPTOMS
no hematuria/normal urinary frequency/no renal colic/no flank pain R/no bladder infections/no flank pain L/no dysuria

## 2019-08-27 NOTE — H&P PST ADULT - NEGATIVE CARDIOVASCULAR SYMPTOMS
no dyspnea on exertion/no palpitations/no orthopnea/no peripheral edema/no paroxysmal nocturnal dyspnea/no claudication/no chest pain

## 2019-08-27 NOTE — H&P PST ADULT - NSICDXFAMILYHX_GEN_ALL_CORE_FT
FAMILY HISTORY:  Father  Still living? No  Family history of heart attack, Age at diagnosis: Age Unknown    Mother  Still living? No  Family history of colon cancer, Age at diagnosis: Age Unknown    Sibling  Still living? Yes, Estimated age: 61-70  Family history of heart disease, Age at diagnosis: Age Unknown

## 2019-08-27 NOTE — H&P PST ADULT - HISTORY OF PRESENT ILLNESS
69y/o male presents for preop eval for scheduled laparoscopic cholecystectomy on 9/12/19.  Pt state since June 2019 intermittent episodes of RUQ abdominal pain & nausea.  Evaluated by PCP, imaging study done.  Dx with acute cholecystitis.

## 2019-08-30 ENCOUNTER — APPOINTMENT (OUTPATIENT)
Dept: SURGERY | Facility: CLINIC | Age: 69
End: 2019-08-30

## 2019-09-11 ENCOUNTER — TRANSCRIPTION ENCOUNTER (OUTPATIENT)
Age: 69
End: 2019-09-11

## 2019-09-12 ENCOUNTER — RESULT REVIEW (OUTPATIENT)
Age: 69
End: 2019-09-12

## 2019-09-12 ENCOUNTER — OUTPATIENT (OUTPATIENT)
Dept: OUTPATIENT SERVICES | Facility: HOSPITAL | Age: 69
LOS: 1 days | Discharge: HOME | End: 2019-09-12
Payer: MEDICARE

## 2019-09-12 VITALS
RESPIRATION RATE: 13 BRPM | HEART RATE: 60 BPM | DIASTOLIC BLOOD PRESSURE: 87 MMHG | SYSTOLIC BLOOD PRESSURE: 160 MMHG | TEMPERATURE: 98 F | OXYGEN SATURATION: 99 %

## 2019-09-12 VITALS
HEIGHT: 66 IN | WEIGHT: 143.96 LBS | DIASTOLIC BLOOD PRESSURE: 89 MMHG | HEART RATE: 59 BPM | SYSTOLIC BLOOD PRESSURE: 147 MMHG | RESPIRATION RATE: 14 BRPM | OXYGEN SATURATION: 97 % | TEMPERATURE: 98 F

## 2019-09-12 DIAGNOSIS — K81.0 ACUTE CHOLECYSTITIS: ICD-10-CM

## 2019-09-12 DIAGNOSIS — Z98.890 OTHER SPECIFIED POSTPROCEDURAL STATES: Chronic | ICD-10-CM

## 2019-09-12 DIAGNOSIS — Z98.89 OTHER SPECIFIED POSTPROCEDURAL STATES: Chronic | ICD-10-CM

## 2019-09-12 PROCEDURE — 88304 TISSUE EXAM BY PATHOLOGIST: CPT | Mod: 26

## 2019-09-12 RX ORDER — FENTANYL CITRATE 50 UG/ML
25 INJECTION INTRAVENOUS
Refills: 0 | Status: DISCONTINUED | OUTPATIENT
Start: 2019-09-12 | End: 2019-09-12

## 2019-09-12 RX ORDER — OXYCODONE HYDROCHLORIDE 5 MG/1
10 TABLET ORAL ONCE
Refills: 0 | Status: DISCONTINUED | OUTPATIENT
Start: 2019-09-12 | End: 2019-09-12

## 2019-09-12 RX ORDER — OXYCODONE HYDROCHLORIDE 5 MG/1
5 TABLET ORAL ONCE
Refills: 0 | Status: DISCONTINUED | OUTPATIENT
Start: 2019-09-12 | End: 2019-09-12

## 2019-09-12 RX ORDER — EZETIMIBE 10 MG/1
1 TABLET ORAL
Qty: 0 | Refills: 0 | DISCHARGE

## 2019-09-12 RX ORDER — FENTANYL CITRATE 50 UG/ML
50 INJECTION INTRAVENOUS
Refills: 0 | Status: DISCONTINUED | OUTPATIENT
Start: 2019-09-12 | End: 2019-09-12

## 2019-09-12 RX ADMIN — FENTANYL CITRATE 25 MICROGRAM(S): 50 INJECTION INTRAVENOUS at 11:15

## 2019-09-12 RX ADMIN — SODIUM CHLORIDE 30 MILLILITER(S): 9 INJECTION, SOLUTION INTRAVENOUS at 10:15

## 2019-09-12 NOTE — ASU DISCHARGE PLAN (ADULT/PEDIATRIC) - ASU DC SPECIAL INSTRUCTIONSFT
- Leave steri strips in place  - Ice for swelling  - Call to schedule follow-up appointment with Dr. Parr for 7-10 days

## 2019-09-12 NOTE — ASU PREOP CHECKLIST - ALLERGY BAND ON
Patient Education     Constipation (Adult)  Constipation means that you have bowel movements that are less frequent than usual. Stools often become very hard and difficult to pass.  Constipation is very common. At some point in life it affects almost everyone. Since everyone's bowel habits are different, what is constipation to one person may not be to another. Your healthcare provider may do tests to diagnose constipation. It depends on what he or she finds when evaluating you.    Symptoms of constipation include:  · Abdominal pain  · Bloating  · Vomiting  · Painful bowel movements  · Itching, swelling, bleeding, or pain around the anus  Causes  Constipation can have many causes. These include:  · Diet low in fiber  · Too much dairy  · Not drinking enough liquids  · Lack of exercise or physical activity. This is especially true for older adults.  · Changes in lifestyle or daily routine, including pregnancy, aging, work, and travel  · Frequent use or misuse of laxatives  · Ignoring the urge to have a bowel movement or delaying it until later  · Medicines, such as certain prescription pain medicines, iron supplements, antacids, certain antidepressants, and calcium supplements  · Diseases like irritable bowel syndrome, bowel obstructions, stroke, diabetes, thyroid disease, Parkinson disease, hemorrhoids, and colon cancer  Complications  Potential complications of constipation can include:  · Hemorrhoids  · Rectal bleeding from hemorrhoids or anal fissures (skin tears)  · Hernias  · Dependency on laxatives  · Chronic constipation  · Fecal impaction  · Bowel obstruction or perforation  Home care  All treatment should be done after talking with your healthcare provider. This is especially true if you have another medical problems, are taking prescription medicines, or are an older adult. Treatment most often involves lifestyle changes. You may also need medicines. Your healthcare provider will tell you which will work  best for you. Follow the advice below to help avoid this problem in the future.  Lifestyle changes  These lifestyle changes can help prevent constipation:  · Diet. Eat a high-fiber diet, with fresh fruit and vegetables, and reduce dairy intake, meats, and processed foods  · Fluids. It's important to get enough fluids each day. Drink plenty of water when you eat more fiber. If you are on diet that limits the amount of fluid you can have, talk about this with your healthcare provider.  · Regular exercise. Check with your healthcare provider first.  Medications  Take any medicines as directed. Some laxatives are safe to use only every now and then. Others can be taken on a regular basis. Talk with your doctor or pharmacist if you have questions.  Prescription pain medicines can cause constipation. If you are taking this kind of medicine, ask your healthcare provider if you should also take a stool softener.  Medicines you may take to treat constipation include:  · Fiber supplements  · Stool softeners  · Laxatives  · Enemas  · Rectal suppositories  Follow-up care  Follow up with your healthcare provider if symptoms don't get better in the next few days. You may need to have more tests or see a specialist.  Call 911  Call 911 if any of these occur:  · Trouble breathing  · Stiff, rigid abdomen that is severely painful to touch  · Confusion  · Fainting or loss of consciousness  · Rapid heart rate  · Chest pain  When to seek medical advice  Call your healthcare provider right away if any of these occur:  · Fever over 100.4°F (38°C)  · Failure to resume normal bowel movements  · Pain in your abdomen or back gets worse  · Nausea or vomiting  · Swelling in your abdomen  · Blood in the stool  · Black, tarry stool  · Involuntary weight loss  · Weakness  © 9983-0977 [employer]. [address] [place] [address], [place] [address]. All rights reserved. This information is not intended as a substitute for professional medical  care. Always follow your healthcare professional's instructions.            no known allergies

## 2019-09-12 NOTE — ASU PATIENT PROFILE, ADULT - PMH
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

## 2019-09-12 NOTE — ASU DISCHARGE PLAN (ADULT/PEDIATRIC) - CALL YOUR DOCTOR IF YOU HAVE ANY OF THE FOLLOWING:
Pain not relieved by Medications/Fever greater than (need to indicate Fahrenheit or Celsius)/Unable to urinate/Wound/Surgical Site with redness, or foul smelling discharge or pus/Bleeding that does not stop

## 2019-09-12 NOTE — ASU DISCHARGE PLAN (ADULT/PEDIATRIC) - CARE PROVIDER_API CALL
Tyrell Parr)  ColonRectal Surgery; Surgery  [address] [facility] [address]  [place] [address]  Phone: [phone]  Fax: [phone]  Follow Up Time:

## 2019-09-12 NOTE — ASU DISCHARGE PLAN (ADULT/PEDIATRIC) - NURSING INSTRUCTIONS
If you experience bleeding from the site pain not relived by pain medication nausea vomiting abdominal distension unable to urinate call your doctor. If you experience bleeding from the site pain not relived by pain medication nausea vomiting abdominal distension unable to urinate call your doctor.   You were given intravenous TYLENOL for pain management at _______________. Please DO NOT take any products containing TYLENOL or ACETAMINOPHEN, such as VICODIN, PERCOCET, EXCEDRIN, and any over-the-counter cold medication for the next 6 hours (until ______________). DO NOT TAKE MORE THAN 3000 MG OF TYLENOL in a 24 hour period.  You were given intravenous TORADOL for pain management at ______________. Please DO NOT take ibuprofen containing products such as MOTRIN or ADVIL, or any other NSAIDs (Non-Steroidal Anti-Inflammatory Drugs) such as ALEVE for the next 6 hours (until ______________). If you experience bleeding from the site pain not relived by pain medication nausea vomiting abdominal distension unable to urinate call your doctor.   You were given intravenous TYLENOL for pain management at ____09 :35___________. Please DO NOT take any products containing TYLENOL or ACETAMINOPHEN, such as VICODIN, PERCOCET, EXCEDRIN, and any over-the-counter cold medication for the next 6 hours (until __15 ;35____________). DO NOT TAKE MORE THAN 3000 MG OF TYLENOL in a 24 hour period.  You were given intravenous TORADOL for pain management at ___0945___________. Please DO NOT take ibuprofen containing products such as MOTRIN or ADVIL, or any other NSAIDs (Non-Steroidal Anti-Inflammatory Drugs) such as ALEVE for the next 6 hours (until ______15 ;45________).

## 2019-11-18 PROBLEM — K71.6: Chronic | Status: ACTIVE | Noted: 2019-08-27

## 2019-11-18 PROBLEM — K81.0 ACUTE CHOLECYSTITIS: Chronic | Status: ACTIVE | Noted: 2019-08-27

## 2020-05-29 ENCOUNTER — APPOINTMENT (OUTPATIENT)
Dept: OPHTHALMOLOGY | Facility: CLINIC | Age: 70
End: 2020-05-29
Payer: MEDICARE

## 2020-05-29 ENCOUNTER — NON-APPOINTMENT (OUTPATIENT)
Age: 70
End: 2020-05-29

## 2020-05-29 PROCEDURE — 92202 OPSCPY EXTND ON/MAC DRAW: CPT

## 2020-05-29 PROCEDURE — 92014 COMPRE OPH EXAM EST PT 1/>: CPT

## 2020-05-29 PROCEDURE — 92133 CPTRZD OPH DX IMG PST SGM ON: CPT

## 2020-06-15 ENCOUNTER — APPOINTMENT (OUTPATIENT)
Dept: ORTHOPEDIC SURGERY | Facility: CLINIC | Age: 70
End: 2020-06-15
Payer: MEDICARE

## 2020-06-15 DIAGNOSIS — M79.642 PAIN IN LEFT HAND: ICD-10-CM

## 2020-06-15 DIAGNOSIS — M65.312 TRIGGER THUMB, LEFT THUMB: ICD-10-CM

## 2020-06-15 PROCEDURE — 99214 OFFICE O/P EST MOD 30 MIN: CPT | Mod: 25

## 2020-06-15 PROCEDURE — 20550 NJX 1 TENDON SHEATH/LIGAMENT: CPT | Mod: FA

## 2020-06-15 PROCEDURE — 73130 X-RAY EXAM OF HAND: CPT | Mod: LT

## 2020-06-15 NOTE — PHYSICAL EXAM
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 [de-identified] : Patient is WDWN, alert, and in no acute distress. Breathing is unlabored. He is grossly oriented to person, place, and time.

## 2020-06-15 NOTE — HISTORY OF PRESENT ILLNESS
[Left] : left hand dominant [FreeTextEntry1] : Pt c/o left hand pain intermittently for 3 months.  He states that last week the pain was severe.  He states that he feels pain in his left thumb, index and long fingers.  The long finger triggers occasionally.  He has tenderness over the A1 pulley of the  thumb and long finger.  He has pain with gripping.  It is tender to touch.  He has difficulty lifting and twisting.  He is not currently taking anything for pain.

## 2020-06-15 NOTE — PROCEDURE
[FreeTextEntry1] : The patient was informed of the pathophysiology of his condition and the treratment options.\par He would like to proceed with a steroid injection.\par He was given an injection of 0.5cc lidocaine, 0.25cc [id number] and 0.25cc dexamethasone in the left thumb and long finger flexor tendon sheah at the A1 pulley.\par He tolerated the injections well.\par Followup as needed.

## 2020-07-23 ENCOUNTER — APPOINTMENT (OUTPATIENT)
Dept: ORTHOPEDIC SURGERY | Facility: CLINIC | Age: 70
End: 2020-07-23
Payer: MEDICARE

## 2020-07-23 DIAGNOSIS — S83.282A OTHER TEAR OF LATERAL MENISCUS, CURRENT INJURY, LEFT KNEE, INITIAL ENCOUNTER: ICD-10-CM

## 2020-07-23 DIAGNOSIS — S83.512A SPRAIN OF ANTERIOR CRUCIATE LIGAMENT OF LEFT KNEE, INITIAL ENCOUNTER: ICD-10-CM

## 2020-07-23 DIAGNOSIS — S83.242A OTHER TEAR OF MEDIAL MENISCUS, CURRENT INJURY, LEFT KNEE, INITIAL ENCOUNTER: ICD-10-CM

## 2020-07-23 PROCEDURE — 99213 OFFICE O/P EST LOW 20 MIN: CPT

## 2020-07-23 PROCEDURE — 73560 X-RAY EXAM OF KNEE 1 OR 2: CPT | Mod: LT

## 2020-07-23 NOTE — HISTORY OF PRESENT ILLNESS
[de-identified] : 68 year old male presents for an evaluation of left knee pain, s/p Left knee arthroscopy with anterior cruciate ligament reconstruction and medial and lateral meniscectomies on 12/12/2018. The patient has been doing well and that he has minimal discomfort about his knee and that he has been able to wean back into his regular physical activities without pain including golf, bike rides and walking. Patient reports on Sunday 07/19/2020, he was leaning on is right knee in a fencer position noting a severe increase in left knee pain. Patient describes the pain as a burning sensation. He reports mild swelling.  Denies instability or locking of the knee. He has tried ice for pain relief with mild relief in symptoms

## 2020-07-23 NOTE — DISCUSSION/SUMMARY
[de-identified] : The underlying pathophysiology was reviewed in great detail with the patient as well as the various treatment options, including ice, analgesics, NSAIDs, Physical therapy, steroid injections, lidocaine patches, \par \par Discussed pain may be caused by irritation of the superficial sensory branch of the saphenous nerve. \par \par Activity modifications and restrictions were discussed. I advised avoiding deep bending, squatting and high intensity activity. \par \par FU PRN.\par \par All questions were answered, all alternatives discussed and the patient is in complete agreement with that plan. Follow-up appointment as instructed. Any issues and the patient will call or come in sooner. \par

## 2020-10-15 ENCOUNTER — APPOINTMENT (OUTPATIENT)
Dept: ORTHOPEDIC SURGERY | Facility: CLINIC | Age: 70
End: 2020-10-15

## 2020-11-03 ENCOUNTER — APPOINTMENT (OUTPATIENT)
Dept: OPHTHALMOLOGY | Facility: CLINIC | Age: 70
End: 2020-11-03
Payer: MEDICARE

## 2020-11-03 ENCOUNTER — NON-APPOINTMENT (OUTPATIENT)
Age: 70
End: 2020-11-03

## 2020-11-03 PROCEDURE — 92014 COMPRE OPH EXAM EST PT 1/>: CPT

## 2020-11-03 PROCEDURE — 92083 EXTENDED VISUAL FIELD XM: CPT

## 2020-11-23 ENCOUNTER — APPOINTMENT (OUTPATIENT)
Dept: ORTHOPEDIC SURGERY | Facility: CLINIC | Age: 70
End: 2020-11-23
Payer: MEDICARE

## 2020-11-23 DIAGNOSIS — M79.644 PAIN IN RIGHT FINGER(S): ICD-10-CM

## 2020-11-23 DIAGNOSIS — S63.114A: ICD-10-CM

## 2020-11-23 PROCEDURE — 73130 X-RAY EXAM OF HAND: CPT | Mod: RT

## 2020-11-23 PROCEDURE — 99214 OFFICE O/P EST MOD 30 MIN: CPT

## 2020-11-24 NOTE — DISCUSSION/SUMMARY
[FreeTextEntry1] : \par \par The underlying pathophysiology was reviewed with the patient. Treatment options were discussed including; nonsurgical and surgical intervention. The surgical options include ligament reconstruction vs MCP arthrodesis.I advised the arthrodesis procedure as it is more reliable in terms of long term prognosis since his injury is 2 months old.Risks and benefits were discussed with the patient.\par \par The patient wishes to consider his options at this time.

## 2020-11-24 NOTE — HISTORY OF PRESENT ILLNESS
[FreeTextEntry1] : JEFF MARINA is a 69 year old left hand dominant male.\par \par He returns c/o right thumb pain with acute onset.He was golfing 2 months ago when he had an injury to his right thumb. He dislocated the right thumb MCP joint after swinging a golf club and accidentally letting go of the left hand. He did not seek medical care at the time.He has notice a deformity in his right thumb. The pain is better and rated a 2 out of 10, described as sharp without radiation. NSAIDs makes the pain better and golfing and gardening makes the pain worse. The patient denies associated symptoms of locking. The patient denies pain at night affecting sleep .He denies any numbness in his right hand. \par

## 2020-11-24 NOTE — PHYSICAL EXAM
[de-identified] : Patient is WDWN, alert, and in no acute distress. Breathing is unlabored. He is grossly oriented to person, place, and time. \par Right hand:\par Gross deformity in right thumb MCP joint compared to the left thumb\par Tender at MCP joint\par MCP unstable to ulnar deviation\par Finger ROM is full\par Sensation is normal [de-identified] : \par \par AP, lateral and oblique views of the right hand were obtained and revealed a volarly dislocated thumb MCP joint. There is also right long finger MCP arthritis

## 2020-11-24 NOTE — ADDENDUM
[FreeTextEntry1] : I, Palmira Alberts wrote this note acting as a scribe for Dr. Chase Curry on Nov 23, 2020.

## 2020-11-24 NOTE — END OF VISIT
[FreeTextEntry3] : I, Chase Curry MD, ordering physician, have read and attest that all the information, medical decision making and discharge instructions within are true and accurate.

## 2020-12-02 ENCOUNTER — APPOINTMENT (OUTPATIENT)
Dept: ORTHOPEDIC SURGERY | Facility: CLINIC | Age: 70
End: 2020-12-02
Payer: MEDICARE

## 2020-12-02 VITALS
HEART RATE: 70 BPM | DIASTOLIC BLOOD PRESSURE: 78 MMHG | HEIGHT: 67 IN | BODY MASS INDEX: 24.01 KG/M2 | SYSTOLIC BLOOD PRESSURE: 171 MMHG | WEIGHT: 153 LBS

## 2020-12-02 DIAGNOSIS — S63.104D UNSPECIFIED DISLOCATION OF RIGHT THUMB, SUBSEQUENT ENCOUNTER: ICD-10-CM

## 2020-12-02 DIAGNOSIS — M65.332 TRIGGER FINGER, LEFT MIDDLE FINGER: ICD-10-CM

## 2020-12-02 DIAGNOSIS — M19.049 PRIMARY OSTEOARTHRITIS, UNSPECIFIED HAND: ICD-10-CM

## 2020-12-02 PROCEDURE — 99214 OFFICE O/P EST MOD 30 MIN: CPT | Mod: 25

## 2020-12-02 PROCEDURE — 20550 NJX 1 TENDON SHEATH/LIGAMENT: CPT | Mod: F2

## 2020-12-07 PROBLEM — S63.104D: Status: ACTIVE | Noted: 2020-12-07

## 2020-12-07 PROBLEM — M65.332 ACQUIRED TRIGGER FINGER OF LEFT MIDDLE FINGER: Status: ACTIVE | Noted: 2020-06-15

## 2021-02-03 ENCOUNTER — NON-APPOINTMENT (OUTPATIENT)
Age: 71
End: 2021-02-03

## 2021-02-18 ENCOUNTER — APPOINTMENT (OUTPATIENT)
Dept: OPHTHALMOLOGY | Facility: CLINIC | Age: 71
End: 2021-02-18
Payer: MEDICARE

## 2021-02-18 ENCOUNTER — NON-APPOINTMENT (OUTPATIENT)
Age: 71
End: 2021-02-18

## 2021-02-18 PROCEDURE — 92012 INTRM OPH EXAM EST PATIENT: CPT

## 2021-02-18 PROCEDURE — 92083 EXTENDED VISUAL FIELD XM: CPT

## 2021-02-18 PROCEDURE — 92133 CPTRZD OPH DX IMG PST SGM ON: CPT

## 2021-04-02 ENCOUNTER — NON-APPOINTMENT (OUTPATIENT)
Age: 71
End: 2021-04-02

## 2021-04-02 ENCOUNTER — APPOINTMENT (OUTPATIENT)
Dept: OPHTHALMOLOGY | Facility: CLINIC | Age: 71
End: 2021-04-02
Payer: MEDICARE

## 2021-04-02 PROCEDURE — 92014 COMPRE OPH EXAM EST PT 1/>: CPT

## 2021-05-04 ENCOUNTER — NON-APPOINTMENT (OUTPATIENT)
Age: 71
End: 2021-05-04

## 2021-05-04 ENCOUNTER — APPOINTMENT (OUTPATIENT)
Dept: OPHTHALMOLOGY | Facility: CLINIC | Age: 71
End: 2021-05-04
Payer: MEDICARE

## 2021-05-04 PROCEDURE — 92012 INTRM OPH EXAM EST PATIENT: CPT

## 2021-05-04 PROCEDURE — 92083 EXTENDED VISUAL FIELD XM: CPT

## 2021-08-03 ENCOUNTER — APPOINTMENT (OUTPATIENT)
Dept: OPHTHALMOLOGY | Facility: CLINIC | Age: 71
End: 2021-08-03

## 2021-08-03 ENCOUNTER — APPOINTMENT (OUTPATIENT)
Dept: ORTHOPEDIC SURGERY | Facility: CLINIC | Age: 71
End: 2021-08-03
Payer: MEDICARE

## 2021-08-03 DIAGNOSIS — M19.011 PRIMARY OSTEOARTHRITIS, RIGHT SHOULDER: ICD-10-CM

## 2021-08-03 PROCEDURE — 73030 X-RAY EXAM OF SHOULDER: CPT | Mod: 50

## 2021-08-03 PROCEDURE — 20610 DRAIN/INJ JOINT/BURSA W/O US: CPT | Mod: LT

## 2021-08-03 PROCEDURE — 99214 OFFICE O/P EST MOD 30 MIN: CPT | Mod: 25

## 2021-08-03 NOTE — DISCUSSION/SUMMARY
[de-identified] : The underlying pathophysiology was reviewed in great detail with the patient as well as the various treatment options, including ice, analgesics, NSAIDs, Physical therapy, steroid injections, lidocaine patches, MRI (left) CT (right) surgical intervention (right TSR versus rTSR)\par \par Patient received a corticosteroid injection of the left shoulder today. Discussed if pain persists, may proceed with a MRI of the left shoulder to rule out rotator cuff tear. \par \par A home exercise sheet was given and discussed with the patient to follow. \par \par Activity modifications and restrictions were discussed. I advised avoiding overhead lifting. I advised the patient to work on good posture. \par \par FU 6 weeks or prn. \par \par All questions were answered, all alternatives discussed and the patient is in complete agreement with that plan. Follow-up appointment as instructed. Any issues and the patient will call or come in sooner. \par

## 2021-08-03 NOTE — PROCEDURE
[de-identified] : At this point I recommended a therapeutic injection and under sterile precautions an injection of 4 cc 1% lidocaine with 0.5 cc of Kenalog and 0.5 cc of Dexamethasone- was placed into the subacromial space of the LEFT shoulder without complication, and after several minutes, the patient felt significant relief.\par \par \par

## 2021-08-03 NOTE — PHYSICAL EXAM
[Normal RLE] : Right Lower Extremity: No scars, rashes, lesions, ulcers, skin intact [Normal Touch] : sensation intact for touch [Normal] : No swelling, no edema, normal pedal pulses and normal temperature [Poor Appearance] : well-appearing [Acute Distress] : not in acute distress [Obese] : not obese [de-identified] : Right Upper Extremity\par o Shoulder :\par ¦ Inspection/Palpation : tenderness over the greater tuberosity, no acromioclavicular joint tenderness, tenderness anterior and posterior glenohumeral joint,no swelling, no deformities\par ¦ Range of Motion : ACTIVE FORWARD ELEVATION: Measured at 120 degrees, ACTIVE EXTERNAL ROTATION: Measured at 30 degrees, ACTIVE INTERNAL ROTATION: Measured at L1\par ¦ Strength : external rotation 5/5, internal rotation 5/5, supraspinatus 5/5 all with pain. \par ¦ Stability : no joint instability on provocative testing\par ¦ Tests/Signs : Neer (+), Titus (+)\par o Upper Arm : no tenderness, no swelling, no deformities\par o Muscle Bulk : no atrophy\par o Sensation : sensation intact to light touch\par o Skin : no skin rash or discoloration\par o Vascular Exam : no edema, no cyanosis, radial and ulnar pulses normal\par \par Left Upper Extremity\par o Shoulder :\par ¦ Inspection/Palpation : tenderness over the greater tuberosity, no acromioclavicular joint tenderness, no tenderness anterior and posterior glenohumeral joint,no swelling, no deformities\par ¦ Range of Motion : ACTIVE FORWARD ELEVATION: Measured at 140 degrees, ACTIVE EXTERNAL ROTATION: Measured at 35 degrees, ACTIVE INTERNAL ROTATION: Measured at T6\par ¦ Strength : external rotation 5/5, internal rotation 5/5, supraspinatus 5/5 all with pain. \par ¦ Stability : no joint instability on provocative testing\par ¦ Tests/Signs : Neer (+), Titus (+)\par o Upper Arm : no tenderness, no swelling, no deformities\par o Muscle Bulk : no atrophy\par o Sensation : sensation intact to light touch\par o Skin : no skin rash or discoloration\par o Vascular Exam : no edema, no cyanosis, radial and ulnar pulses normal [de-identified] : o RIGHT Shoulder : Grashey, Axillary and Outlet views were obtained, there are no soft tissue abnormalities, no fractures, alignment is normal, normal appearing joint spaces, normal bone density, no bony lesions, advanced glenohumeral osteoarthritis  \par \par o LEFT Shoulder : Grashey, Axillary and Outlet views were obtained, there are no soft tissue abnormalities, no fractures, alignment is normal, normal appearing joint spaces, normal bone density, degenerative cystic changed greater tuberosity \par \par

## 2021-08-03 NOTE — HISTORY OF PRESENT ILLNESS
[de-identified] : JEFF MARINA is a 70 year old RHD male presenting to the office complaining of bilateral shoulder pain L>R. Patient reports pain intermittently for years with woresening pain overtime.  Patient denies injury or trauma to the area. The patient describes the pain as a dull aching, and occasionally sharp pain localized to the anterior aspect of his  bilateral shoulders that is intermittent in nature. His symptoms are exacerbated with any movement of the shoulder, more specifically worsened by lifting, repetitive use/activity, lying on the affected side, and overhead activities. Patient reports the pain is waking him up at night.  Patient reports associated weakness and limited range of motion of the left shoulder. Denies numbness and tingling in the upper extremity. He is taking Tylenol for pain relief with good relief in symptoms. Patient denies any other complaints at this time.

## 2021-09-30 ENCOUNTER — TRANSCRIPTION ENCOUNTER (OUTPATIENT)
Age: 71
End: 2021-09-30

## 2021-10-06 ENCOUNTER — APPOINTMENT (OUTPATIENT)
Dept: ORTHOPEDIC SURGERY | Facility: CLINIC | Age: 71
End: 2021-10-06
Payer: MEDICARE

## 2021-10-06 ENCOUNTER — NON-APPOINTMENT (OUTPATIENT)
Age: 71
End: 2021-10-06

## 2021-10-06 VITALS
WEIGHT: 150 LBS | HEART RATE: 65 BPM | SYSTOLIC BLOOD PRESSURE: 158 MMHG | DIASTOLIC BLOOD PRESSURE: 85 MMHG | HEIGHT: 66.5 IN | BODY MASS INDEX: 23.82 KG/M2

## 2021-10-06 DIAGNOSIS — M54.16 RADICULOPATHY, LUMBAR REGION: ICD-10-CM

## 2021-10-06 DIAGNOSIS — M43.17 SPONDYLOLISTHESIS, LUMBOSACRAL REGION: ICD-10-CM

## 2021-10-06 PROCEDURE — 72110 X-RAY EXAM L-2 SPINE 4/>VWS: CPT

## 2021-10-06 PROCEDURE — 99213 OFFICE O/P EST LOW 20 MIN: CPT

## 2021-10-06 RX ORDER — METHYLPREDNISOLONE 4 MG/1
4 TABLET ORAL
Qty: 1 | Refills: 0 | Status: ACTIVE | COMMUNITY
Start: 2021-10-06 | End: 1900-01-01

## 2021-10-08 ENCOUNTER — APPOINTMENT (OUTPATIENT)
Dept: ORTHOPEDIC SURGERY | Facility: CLINIC | Age: 71
End: 2021-10-08
Payer: MEDICARE

## 2021-10-08 PROCEDURE — 99214 OFFICE O/P EST MOD 30 MIN: CPT

## 2021-10-08 NOTE — PHYSICAL EXAM
[Normal RLE] : Right Lower Extremity: No scars, rashes, lesions, ulcers, skin intact [Normal Touch] : sensation intact for touch [Normal] : No swelling, no edema, normal pedal pulses and normal temperature [Poor Appearance] : well-appearing [Acute Distress] : not in acute distress [Obese] : not obese [de-identified] : Right Upper Extremity\par o Shoulder :\par ¦ Inspection/Palpation : tenderness over the greater tuberosity, no acromioclavicular joint tenderness, tenderness anterior and posterior glenohumeral joint,no swelling, no deformities\par ¦ Range of Motion : ACTIVE FORWARD ELEVATION: Measured at 120 degrees, ACTIVE EXTERNAL ROTATION: Measured at 30 degrees, ACTIVE INTERNAL ROTATION: Measured at L1\par ¦ Strength : external rotation 5/5, internal rotation 5/5, supraspinatus 5/5 all with pain. \par ¦ Stability : no joint instability on provocative testing\par ¦ Tests/Signs : Neer (+), Titus (+)\par o Upper Arm : no tenderness, no swelling, no deformities\par o Muscle Bulk : no atrophy\par o Sensation : sensation intact to light touch\par o Skin : no skin rash or discoloration\par o Vascular Exam : no edema, no cyanosis, radial and ulnar pulses normal\par \par Left Upper Extremity\par o Shoulder :\par ¦ Inspection/Palpation : tenderness over the greater tuberosity, no acromioclavicular joint tenderness, no tenderness anterior and posterior glenohumeral joint,no swelling, no deformities\par ¦ Range of Motion : ACTIVE FORWARD ELEVATION: Measured at 140 degrees, ACTIVE EXTERNAL ROTATION: Measured at 35 degrees, ACTIVE INTERNAL ROTATION: Measured at T6\par ¦ Strength : external rotation 5/5, internal rotation 5/5, supraspinatus 5/5 all with pain. \par ¦ Stability : no joint instability on provocative testing\par ¦ Tests/Signs : Neer (+), Titus (+)\par o Upper Arm : no tenderness, no swelling, no deformities\par o Muscle Bulk : no atrophy\par o Sensation : sensation intact to light touch\par o Skin : no skin rash or discoloration\par o Vascular Exam : no edema, no cyanosis, radial and ulnar pulses normal

## 2021-10-08 NOTE — HISTORY OF PRESENT ILLNESS
[de-identified] : JEFF MARINA is a 70 year old RHD male presenting to the office complaining of bilateral shoulder pain L>R. Patient reports pain intermittently for years with worsening pain overtime.  Patient denies injury or trauma to the area. At his last visit on 08/3/2021 patient received a corticosteroid injection of the left knee noting mild temporary relief in pain. Patient notes the pain has returned and he would like to proceed with a MRI of the left shoulder.  The patient describes the pain as a dull aching, and occasionally sharp pain localized to the anterior aspect of his  bilateral shoulders that is intermittent in nature. His symptoms are exacerbated with any movement of the shoulder, more specifically worsened by lifting, repetitive use/activity, lying on the affected side, and overhead activities. Patient reports the pain is waking him up at night.  Patient reports associated weakness and limited range of motion of the left shoulder. Denies numbness and tingling in the upper extremity. He is taking Tylenol for pain relief with good relief in symptoms. Patient denies any other complaints at this time.

## 2021-10-08 NOTE — DISCUSSION/SUMMARY
[de-identified] : The underlying pathophysiology was reviewed in great detail with the patient as well as the various treatment options, including ice, analgesics, NSAIDs, Physical therapy, steroid injections, lidocaine patches, MRI (left) CT (right) surgical intervention (right TSR versus rTSR)\par \par A prescription was provided for a MRI of the left shoulder to rule out rotator cuff tear.  \par \par A home exercise sheet was given and discussed with the patient to follow. \par \par Activity modifications and restrictions were discussed. I advised avoiding overhead lifting. I advised the patient to work on good posture. \par \par FU once MRI results are obtained. \par \par All questions were answered, all alternatives discussed and the patient is in complete agreement with that plan. Follow-up appointment as instructed. Any issues and the patient will call or come in sooner. \par

## 2021-10-12 ENCOUNTER — NON-APPOINTMENT (OUTPATIENT)
Age: 71
End: 2021-10-12

## 2021-10-12 ENCOUNTER — APPOINTMENT (OUTPATIENT)
Dept: OPHTHALMOLOGY | Facility: CLINIC | Age: 71
End: 2021-10-12
Payer: MEDICARE

## 2021-10-12 PROCEDURE — 92014 COMPRE OPH EXAM EST PT 1/>: CPT

## 2021-10-12 PROCEDURE — 92133 CPTRZD OPH DX IMG PST SGM ON: CPT

## 2021-10-18 ENCOUNTER — APPOINTMENT (OUTPATIENT)
Dept: ORTHOPEDIC SURGERY | Facility: CLINIC | Age: 71
End: 2021-10-18
Payer: MEDICARE

## 2021-10-18 VITALS — BODY MASS INDEX: 23.82 KG/M2 | WEIGHT: 150 LBS | HEIGHT: 66.5 IN

## 2021-10-18 PROCEDURE — 99213 OFFICE O/P EST LOW 20 MIN: CPT

## 2021-11-18 ENCOUNTER — APPOINTMENT (OUTPATIENT)
Dept: ORTHOPEDIC SURGERY | Facility: CLINIC | Age: 71
End: 2021-11-18

## 2021-11-30 ENCOUNTER — APPOINTMENT (OUTPATIENT)
Dept: ORTHOPEDIC SURGERY | Facility: CLINIC | Age: 71
End: 2021-11-30

## 2021-12-22 ENCOUNTER — TRANSCRIPTION ENCOUNTER (OUTPATIENT)
Age: 71
End: 2021-12-22

## 2022-04-12 ENCOUNTER — APPOINTMENT (OUTPATIENT)
Dept: OPHTHALMOLOGY | Facility: CLINIC | Age: 72
End: 2022-04-12

## 2022-04-27 NOTE — H&P PST ADULT - SKIN
[facility]  Consult:  Geriatric surgery      Assessment/Plan:  · 41-year-old female referred to [facility] for pre-surgery geriatric screening secondary to advanced age  · Concerns for Advanced age, HTN, & hyperlipidemia  · She is scheduled on  Case: [id number] Date/Time: 05/24/22 0730   Procedure: MINIMALLY INVASIVE LEFT PARATHYROIDECTOMY, POSSIBLE 4 GLAND EXPLORATION, INTRAOPERATIVE PTH MONITORING (Left Neck)   Anesthesia type: General   Diagnosis:        Hypercalcemia [E83 52]       Hyperparathyroidism ([address] ) [E21 3]   Pre-op diagnosis:        Hypercalcemia [E83 52]       Hyperparathyroidism ([address] ) [E21 3]   Location:  OR ROOM 05 / [facility] MAIN OR   Surgeons: Franklin Buchanan MD       No problem-specific Assessment & Plan notes found for this encounter  Problem List Items Addressed This Visit        Cardiovascular and Mediastinum    Primary hypertension  · 150/64  · EKG PENDING   · METS 3 due physical limits  · Denies CP/SOB    losartan-hydrochlorothiazide (HYZAAR) 100-12 5 MG per tablet Hold day of surgery  COPD  · PAST SMOKER  · Stable, lungs clear on exam  · Chronic shortness of breath with exertion  · Chest xray pending   Breo Ellipta 200-25 MCG/INH inhaler Continue to take these inhaler medications on your normal schedule up to and including the day of surgery     albuterol (PROVENTIL HFA,VENTOLIN HFA) 90 mcg/act inhaler Continue to take these inhaler medications on your normal schedule up to and including the day of surgery          Other    Encounter for geriatric assessment - Primary  Recommend inpatient geriatric consult if admitted to hospital   · Cognitive Assessment: 3- no active concerns   · TUG <15 sec:No  · Falls (last 6 months): No  · Hand  score:17  -Attempt 1:16  -Attempt 2:18  -Attempt 3:17  · Geovany Total Score: 18  · PHQ- 9 Depression Scale:3  · Nutrition Assessment Score:14  · Albumin pending   · METS:3 97- 2 2 walker   · Health goals:  -What are your overall health goals? (quit smoking, wt  loss, rest, decrease stress)   Wt Loss  -What brings you strength? (family, friends, [employer], health)   Friends  -What activities are important to you? (exercise, reading, travel, work)   Gardening      Other hyperlipidemia  · Stable    atorvastatin (LIPITOR) 40 mg tablet Take night before surgery           Other Visit Diagnoses                 Subjective:      Patient ID: Danna Fuller is a 81 y o  female who was referred to [facility] for pre-surgery geriatric screening secondary to advanced age  DX primary hyperparathyroidism and a candidate for minimally invasive parathyroidectomy, possible 4 gland exploration, with intraoperative PTH monitoring  I met patient in the [facility] today  She arrived with her grandson  Both were very pleasant  She walks with her seated roller walker  Slow gait but steady  Lives at home with her grandson  She is independent with all ADLs  No cognitive concerns identified on today's exam   She did not appear in any distress  Her last albumin was stable in the 4s  We had a complete her blood work at today's appointment  This blood work is pending  Await results for further needs  Recommend inpatient geriatric consult if patient is admitted to the hospital after surgery due to her advanced age of 81  She is pre frail  As always we discussed having your BEST surgery, and BEST recovery  Surgery goals reviewed today  Breathing exercises   Patient was encouraged to begin lung exercises today  This could be accomplished through deep breathing and cough exercises  Patient was taught how to use an incentive spirometer  Return demonstration provided  Eating/nutrition   Encouraged patient to increase oral protein intake prior to surgery  Based on current weight of   , patient was instructed to consume    Grams of protein a day    This can be accomplished by consuming chicken, fish, tuna fish, cottage cheese, cheese, eggs, [place] yogurt, and protein shakes as needed  I encouraged use of protein shakes such ENLIVE  I also recommended making your own protein shakes with protein powder  Sleep/Stress management  Patient was encouraged to rest their body prior to surgery  Encouraged attempting to get 8 hours of sleep at night  Avoid stress  Avoid sick contacts  Encouraged to find a relaxing hobby such as reading, meditation, listening to music  Training exercises  Patient was encouraged to remain active as possible  Today bilateral lower extremity generic exercises were taught for muscle strengthening and balance  All exercises to be done sitting down  The following portions of the patient's history were reviewed and updated as appropriate: current medications, past family history, past medical history, past social history, past surgical history and problem list     Review of Systems   Constitutional: Negative for chills and fever  HENT: Positive for postnasal drip  Negative for sore throat  Respiratory: Negative for cough, choking and shortness of breath  Cardiovascular: Negative for chest pain and leg swelling  Gastrointestinal: Negative for diarrhea, nausea and vomiting  Genitourinary: Negative for difficulty urinating  Neurological: Positive for headaches  Negative for dizziness and facial asymmetry  Objective: There were no vitals taken for this visit  Physical Exam  Constitutional:       Appearance: Normal appearance  HENT:      Head: Normocephalic  Nose: Nose normal       Mouth/Throat:      Mouth: Mucous membranes are dry  Eyes:      Pupils: Pupils are equal, round, and reactive to light  Cardiovascular:      Rate and Rhythm: Normal rate and regular rhythm  Heart sounds: Murmur heard  Pulmonary:      Effort: Pulmonary effort is normal       Breath sounds: Normal breath sounds  Abdominal:      Palpations: Abdomen is soft  Musculoskeletal:         General: Normal range of motion  Skin:     General: Skin is warm and dry  Neurological:      General: No focal deficit present  Mental Status: She is alert and oriented to person, place, and time  Mental status is at baseline  Psychiatric:         Mood and Affect: Mood normal          Thought Content:  Thought content normal          Judgment: Judgment normal  detailed exam warm and dry/color normal

## 2022-06-07 ENCOUNTER — APPOINTMENT (OUTPATIENT)
Dept: OPHTHALMOLOGY | Facility: CLINIC | Age: 72
End: 2022-06-07

## 2022-06-18 NOTE — END OF VISIT
[FreeTextEntry3] : All medical record entries made by the Areliibe were at my, Dr. Tu Ames, direction and personally dictated by me on 06/11/2019. I have reviewed the chart and agree that the record accurately reflects my personal performance of the history, physical exam, assessment and plan. I have also personally directed, reviewed, and agreed with the chart.  PAST MEDICAL HISTORY:  No pertinent past medical history

## 2022-07-05 ENCOUNTER — APPOINTMENT (OUTPATIENT)
Dept: ORTHOPEDIC SURGERY | Facility: CLINIC | Age: 72
End: 2022-07-05

## 2022-07-05 VITALS — BODY MASS INDEX: 23.3 KG/M2 | WEIGHT: 145 LBS | HEIGHT: 66 IN

## 2022-07-05 DIAGNOSIS — M25.561 PAIN IN RIGHT KNEE: ICD-10-CM

## 2022-07-05 PROCEDURE — 73564 X-RAY EXAM KNEE 4 OR MORE: CPT | Mod: RT

## 2022-07-05 PROCEDURE — 99214 OFFICE O/P EST MOD 30 MIN: CPT

## 2022-08-27 NOTE — BRIEF OPERATIVE NOTE - POST-OP DX
improved
Rupture of anterior cruciate ligament of left knee, initial encounter  12/12/2018    Active  Rula Harris  Tear of lateral meniscus of right knee, current, unspecified tear type, subsequent encounter  12/12/2018    Active  Rula Harris

## 2022-11-15 ENCOUNTER — APPOINTMENT (OUTPATIENT)
Dept: ORTHOPEDIC SURGERY | Facility: CLINIC | Age: 72
End: 2022-11-15

## 2022-11-15 VITALS — BODY MASS INDEX: 23.46 KG/M2 | HEIGHT: 66 IN | WEIGHT: 146 LBS

## 2022-11-15 DIAGNOSIS — M51.36 OTHER INTERVERTEBRAL DISC DEGENERATION, LUMBAR REGION: ICD-10-CM

## 2022-11-15 DIAGNOSIS — M54.16 RADICULOPATHY, LUMBAR REGION: ICD-10-CM

## 2022-11-15 PROCEDURE — 99214 OFFICE O/P EST MOD 30 MIN: CPT

## 2022-11-15 PROCEDURE — 72100 X-RAY EXAM L-S SPINE 2/3 VWS: CPT

## 2022-12-29 NOTE — ASU PREOP CHECKLIST - BLOOD AVAILABLE
[facility] #:[id number]    Medication requested: Dilaudid 4mg     Last fill date: 10/20/2022    Last appointment: 10/17/2022    Next appointment: 01/19/2023   n/a

## 2023-02-02 ENCOUNTER — RX ONLY (RX ONLY)
Age: 73
End: 2023-02-02

## 2023-02-02 ENCOUNTER — OFFICE (OUTPATIENT)
Dept: URBAN - METROPOLITAN AREA CLINIC 27 | Facility: CLINIC | Age: 73
Setting detail: OPHTHALMOLOGY
End: 2023-02-02
Payer: MEDICARE

## 2023-02-02 DIAGNOSIS — H40.1133: ICD-10-CM

## 2023-02-02 DIAGNOSIS — H43.391: ICD-10-CM

## 2023-02-02 DIAGNOSIS — H25.13: ICD-10-CM

## 2023-02-02 PROCEDURE — 92020 GONIOSCOPY: CPT | Performed by: OPHTHALMOLOGY

## 2023-02-02 PROCEDURE — 92083 EXTENDED VISUAL FIELD XM: CPT | Performed by: OPHTHALMOLOGY

## 2023-02-02 PROCEDURE — 92201 OPSCPY EXTND RTA DRAW UNI/BI: CPT | Performed by: OPHTHALMOLOGY

## 2023-02-02 PROCEDURE — 99204 OFFICE O/P NEW MOD 45 MIN: CPT | Performed by: OPHTHALMOLOGY

## 2023-02-02 PROCEDURE — 76514 ECHO EXAM OF EYE THICKNESS: CPT | Performed by: OPHTHALMOLOGY

## 2023-02-02 PROCEDURE — 92133 CPTRZD OPH DX IMG PST SGM ON: CPT | Performed by: OPHTHALMOLOGY

## 2023-02-02 ASSESSMENT — REFRACTION_AUTOREFRACTION
OS_CYLINDER: +1.00
OD_CYLINDER: +1.00
OD_SPHERE: +2.00
OS_SPHERE: +2.00
OD_AXIS: 004
OS_AXIS: 152

## 2023-02-02 ASSESSMENT — CONFRONTATIONAL VISUAL FIELD TEST (CVF)
OD_FINDINGS: FULL
OS_FINDINGS: FULL

## 2023-02-02 ASSESSMENT — REFRACTION_CURRENTRX
OS_ADD: +2.25
OD_CYLINDER: +0.50
OS_AXIS: 156
OD_AXIS: 014
OD_SPHERE: +3.75
OS_CYLINDER: +0.50
OS_SPHERE: +1.50
OS_AXIS: 146
OS_OVR_VA: 20/
OD_OVR_VA: 20/
OS_OVR_VA: 20/
OD_SPHERE: +1.50
OD_ADD: +2.25
OD_VPRISM_DIRECTION: PROGS
OD_AXIS: 011
OS_SPHERE: +3.75
OD_CYLINDER: +0.50
OS_CYLINDER: +0.50
OS_VPRISM_DIRECTION: PROGS
OD_OVR_VA: 20/

## 2023-02-02 ASSESSMENT — KERATOMETRY
OS_K2POWER_DIOPTERS: 44.00
OS_K1POWER_DIOPTERS: 43.50
OD_K2POWER_DIOPTERS: 44.00
OD_K1POWER_DIOPTERS: 44.00
OS_AXISANGLE_DEGREES: 104
OD_AXISANGLE_DEGREES: 090

## 2023-02-02 ASSESSMENT — TONOMETRY
OS_IOP_MMHG: 21
OS_IOP_MMHG: 19
OD_IOP_MMHG: 21

## 2023-02-02 ASSESSMENT — SPHEQUIV_DERIVED
OD_SPHEQUIV: 2.5
OS_SPHEQUIV: 2.5

## 2023-02-02 ASSESSMENT — VISUAL ACUITY
OS_BCVA: 20/20-2
OD_BCVA: 20/20-2

## 2023-02-02 ASSESSMENT — AXIALLENGTH_DERIVED
OD_AL: 22.4884
OS_AL: 22.5721

## 2023-02-24 ENCOUNTER — APPOINTMENT (OUTPATIENT)
Dept: OPHTHALMOLOGY | Facility: CLINIC | Age: 73
End: 2023-02-24

## 2023-03-16 ENCOUNTER — OFFICE (OUTPATIENT)
Dept: URBAN - METROPOLITAN AREA CLINIC 27 | Facility: CLINIC | Age: 73
Setting detail: OPHTHALMOLOGY
End: 2023-03-16
Payer: MEDICARE

## 2023-03-16 DIAGNOSIS — H25.13: ICD-10-CM

## 2023-03-16 DIAGNOSIS — H43.391: ICD-10-CM

## 2023-03-16 DIAGNOSIS — H40.1133: ICD-10-CM

## 2023-03-16 PROCEDURE — 92250 FUNDUS PHOTOGRAPHY W/I&R: CPT | Performed by: OPHTHALMOLOGY

## 2023-03-16 PROCEDURE — 99213 OFFICE O/P EST LOW 20 MIN: CPT | Performed by: OPHTHALMOLOGY

## 2023-03-16 PROCEDURE — 76514 ECHO EXAM OF EYE THICKNESS: CPT | Performed by: OPHTHALMOLOGY

## 2023-03-16 ASSESSMENT — REFRACTION_CURRENTRX
OS_OVR_VA: 20/
OS_SPHERE: +1.50
OS_CYLINDER: +0.50
OD_VPRISM_DIRECTION: PROGS
OS_SPHERE: +3.75
OD_SPHERE: +1.50
OD_CYLINDER: +0.50
OS_OVR_VA: 20/
OD_CYLINDER: +0.50
OD_AXIS: 014
OD_OVR_VA: 20/
OS_AXIS: 146
OD_SPHERE: +3.75
OS_CYLINDER: +0.50
OS_AXIS: 156
OD_AXIS: 011
OS_VPRISM_DIRECTION: PROGS
OD_ADD: +2.25
OD_OVR_VA: 20/
OS_ADD: +2.25

## 2023-03-16 ASSESSMENT — KERATOMETRY
OD_AXISANGLE_DEGREES: 083
OD_K1POWER_DIOPTERS: 43.50
OS_AXISANGLE_DEGREES: 116
OS_K2POWER_DIOPTERS: 44.00
OS_K1POWER_DIOPTERS: 43.50
METHOD_AUTO_MANUAL: AUTO
OD_K2POWER_DIOPTERS: 44.00

## 2023-03-16 ASSESSMENT — REFRACTION_AUTOREFRACTION
OD_AXIS: 010
OS_SPHERE: +2.25
OD_SPHERE: +2.00
OD_CYLINDER: +0.75
OS_AXIS: 153
OS_CYLINDER: +0.75

## 2023-03-16 ASSESSMENT — TONOMETRY
OS_IOP_MMHG: 16
OS_IOP_MMHG: 15
OD_IOP_MMHG: 15
OD_IOP_MMHG: 18

## 2023-03-16 ASSESSMENT — PACHYMETRY
OS_CT_CORRECTION: -6
OS_CT_UM: 636
OD_CT_UM: 676
OD_CT_CORRECTION: >-7

## 2023-03-16 ASSESSMENT — VISUAL ACUITY
OS_BCVA: 20/25-2
OD_BCVA: 20/20-3

## 2023-03-16 ASSESSMENT — CONFRONTATIONAL VISUAL FIELD TEST (CVF)
OD_FINDINGS: FULL
OS_FINDINGS: FULL

## 2023-03-16 ASSESSMENT — SPHEQUIV_DERIVED
OD_SPHEQUIV: 2.375
OS_SPHEQUIV: 2.625

## 2023-03-16 ASSESSMENT — AXIALLENGTH_DERIVED
OD_AL: 22.6168
OS_AL: 22.5276

## 2023-05-05 NOTE — ASU PATIENT PROFILE, ADULT - PATIENT REPRESENTATIVE: ( YOU CAN CHOOSE ANY PERSON THAT CAN ASSIST YOU WITH YOUR HEALTH CARE PREFERENCES, DOES NOT HAVE TO BE A SPOUSE, IMMEDIATE FAMILY OR SIGNIFICANT OTHER/PARTNER)
No previous uterine incision/Omrgan Pregnancy/Less than or equal to 4 previous vaginal births/No known bleeding disorder/No history of postpartum hemorrhage/No other PPH risks indicated
Yes

## 2023-06-15 ENCOUNTER — OFFICE (OUTPATIENT)
Dept: URBAN - METROPOLITAN AREA CLINIC 27 | Facility: CLINIC | Age: 73
Setting detail: OPHTHALMOLOGY
End: 2023-06-15
Payer: MEDICARE

## 2023-06-15 DIAGNOSIS — H25.13: ICD-10-CM

## 2023-06-15 DIAGNOSIS — H43.391: ICD-10-CM

## 2023-06-15 DIAGNOSIS — H40.1133: ICD-10-CM

## 2023-06-15 PROCEDURE — 92020 GONIOSCOPY: CPT | Performed by: OPHTHALMOLOGY

## 2023-06-15 PROCEDURE — 92133 CPTRZD OPH DX IMG PST SGM ON: CPT | Performed by: OPHTHALMOLOGY

## 2023-06-15 PROCEDURE — 99213 OFFICE O/P EST LOW 20 MIN: CPT | Performed by: OPHTHALMOLOGY

## 2023-06-15 ASSESSMENT — VISUAL ACUITY
OD_BCVA: 20/20
OS_BCVA: 20/20-2

## 2023-06-15 ASSESSMENT — REFRACTION_CURRENTRX
OS_CYLINDER: +0.50
OD_AXIS: 011
OS_ADD: +2.25
OD_CYLINDER: +0.50
OD_SPHERE: +1.50
OD_VPRISM_DIRECTION: PROGS
OD_OVR_VA: 20/
OS_AXIS: 146
OD_OVR_VA: 20/
OD_SPHERE: +3.75
OD_CYLINDER: +0.50
OS_OVR_VA: 20/
OS_SPHERE: +1.50
OS_SPHERE: +3.75
OD_AXIS: 014
OD_ADD: +2.25
OS_OVR_VA: 20/
OS_AXIS: 156
OS_CYLINDER: +0.50
OS_VPRISM_DIRECTION: PROGS

## 2023-06-15 ASSESSMENT — REFRACTION_AUTOREFRACTION
OD_AXIS: 004
OS_CYLINDER: +1.00
OS_SPHERE: +2.25
OD_SPHERE: +2.00
OD_CYLINDER: +1.25
OS_AXIS: 155

## 2023-06-15 ASSESSMENT — AXIALLENGTH_DERIVED
OS_AL: 22.4417
OD_AL: 22.4859

## 2023-06-15 ASSESSMENT — TONOMETRY
OD_IOP_MMHG: 15
OS_IOP_MMHG: 16
OD_IOP_MMHG: 16
OS_IOP_MMHG: 16

## 2023-06-15 ASSESSMENT — KERATOMETRY
OD_K1POWER_DIOPTERS: 43.75
OD_K2POWER_DIOPTERS: 44.00
OS_AXISANGLE_DEGREES: 123
OD_AXISANGLE_DEGREES: 036
METHOD_AUTO_MANUAL: AUTO
OS_K2POWER_DIOPTERS: 44.00
OS_K1POWER_DIOPTERS: 43.75

## 2023-06-15 ASSESSMENT — PACHYMETRY
OS_CT_CORRECTION: -6
OS_CT_UM: 636

## 2023-06-15 ASSESSMENT — SPHEQUIV_DERIVED
OD_SPHEQUIV: 2.625
OS_SPHEQUIV: 2.75

## 2023-06-15 ASSESSMENT — CONFRONTATIONAL VISUAL FIELD TEST (CVF)
OS_FINDINGS: FULL
OD_FINDINGS: FULL

## 2023-09-26 ENCOUNTER — APPOINTMENT (OUTPATIENT)
Dept: ORTHOPEDIC SURGERY | Facility: CLINIC | Age: 73
End: 2023-09-26

## 2023-10-03 ENCOUNTER — APPOINTMENT (OUTPATIENT)
Dept: ORTHOPEDIC SURGERY | Facility: CLINIC | Age: 73
End: 2023-10-03
Payer: MEDICARE

## 2023-10-03 VITALS — WEIGHT: 144 LBS | HEIGHT: 66 IN | BODY MASS INDEX: 23.14 KG/M2

## 2023-10-03 PROCEDURE — 73030 X-RAY EXAM OF SHOULDER: CPT | Mod: LT

## 2023-10-03 PROCEDURE — 99214 OFFICE O/P EST MOD 30 MIN: CPT

## 2023-10-17 ENCOUNTER — APPOINTMENT (OUTPATIENT)
Dept: ORTHOPEDIC SURGERY | Facility: CLINIC | Age: 73
End: 2023-10-17
Payer: MEDICARE

## 2023-10-17 VITALS — HEIGHT: 66 IN | WEIGHT: 144 LBS | BODY MASS INDEX: 23.14 KG/M2

## 2023-10-17 PROCEDURE — 99214 OFFICE O/P EST MOD 30 MIN: CPT

## 2023-12-07 ENCOUNTER — OFFICE (OUTPATIENT)
Dept: URBAN - METROPOLITAN AREA CLINIC 27 | Facility: CLINIC | Age: 73
Setting detail: OPHTHALMOLOGY
End: 2023-12-07
Payer: MEDICARE

## 2023-12-07 DIAGNOSIS — H25.13: ICD-10-CM

## 2023-12-07 DIAGNOSIS — H40.1133: ICD-10-CM

## 2023-12-07 DIAGNOSIS — H43.391: ICD-10-CM

## 2023-12-07 PROCEDURE — 92250 FUNDUS PHOTOGRAPHY W/I&R: CPT | Performed by: OPHTHALMOLOGY

## 2023-12-07 PROCEDURE — 92020 GONIOSCOPY: CPT | Performed by: OPHTHALMOLOGY

## 2023-12-07 PROCEDURE — 92083 EXTENDED VISUAL FIELD XM: CPT | Performed by: OPHTHALMOLOGY

## 2023-12-07 PROCEDURE — 92014 COMPRE OPH EXAM EST PT 1/>: CPT | Performed by: OPHTHALMOLOGY

## 2023-12-07 ASSESSMENT — REFRACTION_CURRENTRX
OS_VPRISM_DIRECTION: PROGS
OS_OVR_VA: 20/
OD_CYLINDER: +0.50
OS_ADD: +2.25
OD_AXIS: 014
OS_CYLINDER: +0.50
OS_AXIS: 146
OS_CYLINDER: +0.50
OD_AXIS: 011
OD_OVR_VA: 20/
OD_VPRISM_DIRECTION: PROGS
OS_SPHERE: +1.50
OD_SPHERE: +1.50
OD_OVR_VA: 20/
OD_SPHERE: +3.75
OS_AXIS: 156
OS_OVR_VA: 20/
OD_ADD: +2.25
OS_SPHERE: +3.75
OD_CYLINDER: +0.50

## 2023-12-07 ASSESSMENT — SPHEQUIV_DERIVED
OS_SPHEQUIV: 2.875
OD_SPHEQUIV: 2.875

## 2023-12-07 ASSESSMENT — REFRACTION_AUTOREFRACTION
OS_CYLINDER: +0.75
OS_SPHERE: +2.50
OD_CYLINDER: +1.25
OD_SPHERE: +2.25
OS_AXIS: 146
OD_AXIS: 179

## 2023-12-07 ASSESSMENT — CONFRONTATIONAL VISUAL FIELD TEST (CVF)
OS_FINDINGS: FULL
OD_FINDINGS: FULL

## 2024-01-18 ENCOUNTER — OFFICE (OUTPATIENT)
Dept: URBAN - METROPOLITAN AREA CLINIC 27 | Facility: CLINIC | Age: 74
Setting detail: OPHTHALMOLOGY
End: 2024-01-18
Payer: MEDICARE

## 2024-01-18 DIAGNOSIS — H25.13: ICD-10-CM

## 2024-01-18 DIAGNOSIS — H43.391: ICD-10-CM

## 2024-01-18 DIAGNOSIS — H40.1133: ICD-10-CM

## 2024-01-18 PROCEDURE — 92133 CPTRZD OPH DX IMG PST SGM ON: CPT | Performed by: OPHTHALMOLOGY

## 2024-01-18 PROCEDURE — 99213 OFFICE O/P EST LOW 20 MIN: CPT | Performed by: OPHTHALMOLOGY

## 2024-01-18 ASSESSMENT — REFRACTION_CURRENTRX
OS_OVR_VA: 20/
OD_SPHERE: +1.50
OS_SPHERE: +1.50
OS_AXIS: 146
OD_AXIS: 011
OD_SPHERE: +3.75
OS_CYLINDER: +0.50
OS_AXIS: 156
OD_CYLINDER: +0.50
OS_VPRISM_DIRECTION: PROGS
OD_CYLINDER: +0.50
OD_OVR_VA: 20/
OS_ADD: +2.25
OD_OVR_VA: 20/
OS_OVR_VA: 20/
OD_VPRISM_DIRECTION: PROGS
OS_CYLINDER: +0.50
OS_SPHERE: +3.75
OD_AXIS: 014
OD_ADD: +2.25

## 2024-01-18 ASSESSMENT — CONFRONTATIONAL VISUAL FIELD TEST (CVF)
OD_FINDINGS: FULL
OS_FINDINGS: FULL

## 2024-01-18 ASSESSMENT — REFRACTION_AUTOREFRACTION
OD_AXIS: 180
OD_CYLINDER: +1.25
OS_SPHERE: +2.50
OS_AXIS: 142
OS_CYLINDER: +0.75
OD_SPHERE: +2.25

## 2024-01-18 ASSESSMENT — SPHEQUIV_DERIVED
OD_SPHEQUIV: 2.875
OS_SPHEQUIV: 2.875

## 2024-03-06 ENCOUNTER — APPOINTMENT (OUTPATIENT)
Dept: ORTHOPEDIC SURGERY | Facility: CLINIC | Age: 74
End: 2024-03-06
Payer: MEDICARE

## 2024-03-06 VITALS — BODY MASS INDEX: 23.14 KG/M2 | WEIGHT: 144 LBS | HEIGHT: 66 IN

## 2024-03-06 DIAGNOSIS — M75.112 INCOMPLETE ROTATOR CUFF TEAR OR RUPTURE OF LEFT SHOULDER, NOT SPECIFIED AS TRAUMATIC: ICD-10-CM

## 2024-03-06 DIAGNOSIS — M75.42 IMPINGEMENT SYNDROME OF LEFT SHOULDER: ICD-10-CM

## 2024-03-06 PROCEDURE — 99214 OFFICE O/P EST MOD 30 MIN: CPT | Mod: 25

## 2024-03-06 PROCEDURE — 20610 DRAIN/INJ JOINT/BURSA W/O US: CPT | Mod: LT

## 2024-06-06 ENCOUNTER — OFFICE (OUTPATIENT)
Dept: URBAN - METROPOLITAN AREA CLINIC 27 | Facility: CLINIC | Age: 74
Setting detail: OPHTHALMOLOGY
End: 2024-06-06
Payer: MEDICARE

## 2024-06-06 DIAGNOSIS — H40.1133: ICD-10-CM

## 2024-06-06 DIAGNOSIS — H25.13: ICD-10-CM

## 2024-06-06 PROCEDURE — 99213 OFFICE O/P EST LOW 20 MIN: CPT | Performed by: OPHTHALMOLOGY

## 2024-06-06 PROCEDURE — 92083 EXTENDED VISUAL FIELD XM: CPT | Performed by: OPHTHALMOLOGY

## 2024-06-06 PROCEDURE — 92133 CPTRZD OPH DX IMG PST SGM ON: CPT | Performed by: OPHTHALMOLOGY

## 2024-06-06 ASSESSMENT — CONFRONTATIONAL VISUAL FIELD TEST (CVF)
OD_FINDINGS: FULL
OS_FINDINGS: FULL

## 2024-07-25 ENCOUNTER — APPOINTMENT (OUTPATIENT)
Dept: ORTHOPEDIC SURGERY | Facility: CLINIC | Age: 74
End: 2024-07-25
Payer: MEDICARE

## 2024-07-25 ENCOUNTER — NON-APPOINTMENT (OUTPATIENT)
Age: 74
End: 2024-07-25

## 2024-07-25 VITALS — HEIGHT: 66 IN | BODY MASS INDEX: 22.82 KG/M2 | WEIGHT: 142 LBS

## 2024-07-25 DIAGNOSIS — M79.641 PAIN IN RIGHT HAND: ICD-10-CM

## 2024-07-25 DIAGNOSIS — S61.219D LACERATION W/OUT FOREIGN BODY OF UNSPECIFIED FINGER W/OUT DAMAGE TO NAIL, SUBSEQUENT ENCOUNTER: ICD-10-CM

## 2024-07-25 DIAGNOSIS — M79.643 PAIN IN UNSPECIFIED HAND: ICD-10-CM

## 2024-07-25 PROCEDURE — 73130 X-RAY EXAM OF HAND: CPT | Mod: RT

## 2024-07-25 PROCEDURE — 99213 OFFICE O/P EST LOW 20 MIN: CPT

## 2024-07-25 NOTE — HISTORY OF PRESENT ILLNESS
[de-identified] : 73 M, LHD,  PMH HLD, presents with right second and third finger distal laceration sustained 6/22/24 when using a table saw for woodworking. [facility] [place] [facility], he then went to [facility] ED, he had 20 sutures at that time. He finished a 10 day course of antibiotics, he thinks Clindamycin. He endorses NT of the 2nd and 3rd fingers as well as throbbing pain. He endorses pain and swelling of middle finger PIP joint. He has been taking Advil and Tylenol for pain. Denies feveres or chills.

## 2024-07-25 NOTE — PHYSICAL EXAM
[de-identified] : PHYSICAL EXAM: Patient is WDWN, alert, and in no acute distress. Breathing is unlabored. He is grossly oriented to person, place, and time.  Right Hand:  Inspection/Palpation: Distal lacerations of the second and third fingers with healing wounds with nail deformity. Swelling and tenderness at the middle finger PIP joint. No signs of infection. Range of Motion: Limited in the second and third fingers due to pain and swelling. Strength: Flexion and extension 4/5 in the second and third fingers. Sensation: Decreased sensation to light touch in the second and third fingers. 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

## 2024-07-25 NOTE — DISCUSSION/SUMMARY
[de-identified] : DISCUSSION: The underlying pathophysiology was reviewed with the patient. Discussed at length the nature of the patient's condition, including the distal lacerations, numbness and tingling, pain, and swelling. The severe arthritis in the index and middle MCP  joints was also discussed. Conservative management, including continued use of OTC medications and hand therapy, was recommended. A script for right hand therapy was provided.   All questions answered, understanding verbalized. Patient in agreement with the plan of care. Follow-up as needed

## 2024-08-18 NOTE — PHYSICAL EXAM
[Normal Touch] : sensation intact for touch [Normal] : No swelling, no edema, normal pedal pulses and normal temperature [Normal RLE] : Right Lower Extremity: No scars, rashes, lesions, ulcers, skin intact [Poor Appearance] : well-appearing [Obese] : not obese [Acute Distress] : not in acute distress [de-identified] : Left Lower Extremity\par o Knee :\par ¦ Inspection/Palpation :  tenderness to palpation anterior medial tibia at the site of the button, no medial or lateral joint line tenderness, no swelling, no deformity, surgical scars well appearing \par ¦ Range of Motion : 0 - 120 degrees, no crepitus\par ¦ Stability : no valgus or varus instability present on provocative testing, Lachman’s Test (-)\par ¦ Strength : flexion and extension 5/5\par ¦ Tests and Signs: Anterior Drawer Test (-) Lachman (-)\par o Muscle Bulk : normal muscle bulk present\par o Skin : no erythema, no ecchymosis \par o Sensation : sensation to pin intact\par o Vascular Exam : no edema, no cyanosis, dorsalis pedis artery pulse 2+, posterior tibial artery pulse 2+  [de-identified] : o Left Knee : AP, and lateral views of the knee were obtained, there are no soft tissue abnormalities, no fractures, alignment is normal, normal appearing joint spaces, normal bone density, no bony lesions, a/p ACL reconstruction with buttons in great positioning and proper alignment. \par \par \par  Peng Vences

## 2024-09-23 ENCOUNTER — APPOINTMENT (OUTPATIENT)
Dept: OTOLARYNGOLOGY | Facility: CLINIC | Age: 74
End: 2024-09-23

## 2024-10-03 ENCOUNTER — OFFICE (OUTPATIENT)
Dept: URBAN - METROPOLITAN AREA CLINIC 27 | Facility: CLINIC | Age: 74
Setting detail: OPHTHALMOLOGY
End: 2024-10-03
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
## 2024-10-03 PROCEDURE — NO SHOW FE NO SHOW FEE: Performed by: OPHTHALMOLOGY

## 2024-10-07 ENCOUNTER — APPOINTMENT (OUTPATIENT)
Dept: OTOLARYNGOLOGY | Facility: CLINIC | Age: 74
End: 2024-10-07

## 2024-12-06 ENCOUNTER — APPOINTMENT (OUTPATIENT)
Dept: ORTHOPEDIC SURGERY | Facility: CLINIC | Age: 74
End: 2024-12-06

## 2025-07-01 ENCOUNTER — APPOINTMENT (OUTPATIENT)
Dept: RHEUMATOLOGY | Facility: CLINIC | Age: 75
End: 2025-07-01